# Patient Record
Sex: FEMALE | Race: BLACK OR AFRICAN AMERICAN | NOT HISPANIC OR LATINO | Employment: UNEMPLOYED | URBAN - METROPOLITAN AREA
[De-identification: names, ages, dates, MRNs, and addresses within clinical notes are randomized per-mention and may not be internally consistent; named-entity substitution may affect disease eponyms.]

---

## 2017-01-10 ENCOUNTER — OFFICE VISIT (OUTPATIENT)
Dept: INTERNAL MEDICINE | Facility: CLINIC | Age: 82
End: 2017-01-10
Payer: MEDICARE

## 2017-01-10 ENCOUNTER — LAB VISIT (OUTPATIENT)
Dept: LAB | Facility: HOSPITAL | Age: 82
End: 2017-01-10
Attending: INTERNAL MEDICINE
Payer: MEDICARE

## 2017-01-10 VITALS
SYSTOLIC BLOOD PRESSURE: 138 MMHG | TEMPERATURE: 99 F | RESPIRATION RATE: 20 BRPM | HEART RATE: 75 BPM | HEIGHT: 65 IN | OXYGEN SATURATION: 97 % | BODY MASS INDEX: 29.12 KG/M2 | DIASTOLIC BLOOD PRESSURE: 90 MMHG | WEIGHT: 174.81 LBS

## 2017-01-10 DIAGNOSIS — Z78.0 ASYMPTOMATIC MENOPAUSAL STATE: ICD-10-CM

## 2017-01-10 DIAGNOSIS — E78.5 HYPERLIPIDEMIA, UNSPECIFIED HYPERLIPIDEMIA TYPE: ICD-10-CM

## 2017-01-10 DIAGNOSIS — Z00.00 ROUTINE GENERAL MEDICAL EXAMINATION AT A HEALTH CARE FACILITY: Primary | ICD-10-CM

## 2017-01-10 DIAGNOSIS — E66.3 OVERWEIGHT (BMI 25.0-29.9): ICD-10-CM

## 2017-01-10 DIAGNOSIS — R53.83 FATIGUE, UNSPECIFIED TYPE: ICD-10-CM

## 2017-01-10 DIAGNOSIS — I50.9 CHRONIC CONGESTIVE HEART FAILURE, UNSPECIFIED CONGESTIVE HEART FAILURE TYPE: ICD-10-CM

## 2017-01-10 DIAGNOSIS — I10 ESSENTIAL HYPERTENSION: ICD-10-CM

## 2017-01-10 DIAGNOSIS — Z00.00 ROUTINE GENERAL MEDICAL EXAMINATION AT A HEALTH CARE FACILITY: ICD-10-CM

## 2017-01-10 DIAGNOSIS — N18.1 CHRONIC KIDNEY DISEASE, STAGE I: ICD-10-CM

## 2017-01-10 LAB
BASOPHILS # BLD AUTO: 0.01 K/UL
BASOPHILS NFR BLD: 0.2 %
DIFFERENTIAL METHOD: ABNORMAL
EOSINOPHIL # BLD AUTO: 0.1 K/UL
EOSINOPHIL NFR BLD: 1.3 %
ERYTHROCYTE [DISTWIDTH] IN BLOOD BY AUTOMATED COUNT: 13.7 %
HCT VFR BLD AUTO: 39.5 %
HGB BLD-MCNC: 13 G/DL
LYMPHOCYTES # BLD AUTO: 1.4 K/UL
LYMPHOCYTES NFR BLD: 23.1 %
MCH RBC QN AUTO: 31.6 PG
MCHC RBC AUTO-ENTMCNC: 32.9 %
MCV RBC AUTO: 96 FL
MONOCYTES # BLD AUTO: 0.7 K/UL
MONOCYTES NFR BLD: 11.6 %
NEUTROPHILS # BLD AUTO: 3.8 K/UL
NEUTROPHILS NFR BLD: 63.3 %
PLATELET # BLD AUTO: 185 K/UL
PMV BLD AUTO: 11.1 FL
RBC # BLD AUTO: 4.12 M/UL
WBC # BLD AUTO: 6.03 K/UL

## 2017-01-10 PROCEDURE — 84439 ASSAY OF FREE THYROXINE: CPT

## 2017-01-10 PROCEDURE — 83880 ASSAY OF NATRIURETIC PEPTIDE: CPT

## 2017-01-10 PROCEDURE — 80061 LIPID PANEL: CPT

## 2017-01-10 PROCEDURE — 80053 COMPREHEN METABOLIC PANEL: CPT

## 2017-01-10 PROCEDURE — 36415 COLL VENOUS BLD VENIPUNCTURE: CPT | Mod: PO

## 2017-01-10 PROCEDURE — 84443 ASSAY THYROID STIM HORMONE: CPT

## 2017-01-10 PROCEDURE — 85025 COMPLETE CBC W/AUTO DIFF WBC: CPT

## 2017-01-10 PROCEDURE — 99204 OFFICE O/P NEW MOD 45 MIN: CPT | Mod: S$PBB,,, | Performed by: INTERNAL MEDICINE

## 2017-01-10 PROCEDURE — 99999 PR PBB SHADOW E&M-EST. PATIENT-LVL III: CPT | Mod: PBBFAC,,, | Performed by: INTERNAL MEDICINE

## 2017-01-10 RX ORDER — HYDRALAZINE HYDROCHLORIDE AND ISOSORBIDE DINITRATE 37.5; 2 MG/1; MG/1
TABLET, FILM COATED ORAL
Refills: 11 | COMMUNITY
Start: 2016-12-19 | End: 2017-08-10 | Stop reason: SDUPTHER

## 2017-01-10 RX ORDER — TROSPIUM CHLORIDE 20 MG/1
20 TABLET, FILM COATED ORAL 2 TIMES DAILY
Refills: 3 | COMMUNITY
Start: 2016-12-15

## 2017-01-10 RX ORDER — POTASSIUM CHLORIDE 20 MEQ/1
TABLET, EXTENDED RELEASE ORAL
Refills: 3 | COMMUNITY
Start: 2016-12-30 | End: 2017-08-24 | Stop reason: ALTCHOICE

## 2017-01-10 RX ORDER — BUMETANIDE 1 MG/1
TABLET ORAL
Refills: 0 | COMMUNITY
Start: 2016-11-21

## 2017-01-10 RX ORDER — ATORVASTATIN CALCIUM 10 MG/1
TABLET, FILM COATED ORAL
Refills: 2 | COMMUNITY
Start: 2016-12-03 | End: 2017-01-24 | Stop reason: DRUGHIGH

## 2017-01-10 RX ORDER — CLOPIDOGREL BISULFATE 75 MG/1
TABLET ORAL
Refills: 1 | COMMUNITY
Start: 2016-10-29

## 2017-01-10 NOTE — MR AVS SNAPSHOT
[facility] Medicine  [address] [place] [address] [address]  [place] [address]  Phone: [phone]                  Amy Ding   1/10/2017 8:20 AM   Office Visit    Description:  Female : 1933   Provider:  Chicho Black MD   Department:  [facility] Internal Medicine           Reason for Visit     Establish Care           Diagnoses this Visit        Comments    Routine general medical examination at a health care facility    -  Primary     Essential hypertension         Chronic congestive heart failure, unspecified congestive heart failure type         Overweight (BMI 25.0-29.9)         Fatigue, unspecified type         Asymptomatic menopausal state         Hyperlipidemia, unspecified hyperlipidemia type         Chronic kidney disease, stage I                To Do List           Future Appointments        Provider Department Dept Phone    2017 8:00 AM Chicho Black MD [facility] [phone]      Goals (5 Years of Data)     None      Follow-Up and Disposition     Return in about 2 weeks (around 2017), or if symptoms worsen or fail to improve, for elevated BP and abdominal tenderness .    Follow-up and Disposition History      [facility]     [facility] On Call Nurse [facility] -  Assistance  Registered nurses in the [facility] On Call Center provide clinical advisement, health education, appointment booking, and other advisory services.  Call for this free service at [phone].             Medications           STOP taking these medications     dicyclomine (BENTYL) 10 MG capsule Take 10 mg by mouth 3 (three) times daily.           Verify that the below list of medications is an accurate representation of the medications you are currently taking.  If none reported, the list may be blank. If incorrect, please contact your healthcare provider. Carry this list with you in case of emergency.           Current Medications     amiodarone (PACERONE) 200 MG Tab Take 200 mg  "by mouth once daily.     hydrochlorothiazide (MICROZIDE) 12.5 mg capsule Take 12.5 mg by mouth once daily.    ISOSORB DINIT/HYDRALAZINE HCL (BIDIL ORAL) Take 0.5 mg by mouth 3 (three) times daily.    atorvastatin (LIPITOR) 10 MG tablet TK 1 T PO  QHS    BIDIL 20-37.5 mg Tab     bumetanide (BUMEX) 1 MG tablet TK 1 T PO BID    clopidogrel (PLAVIX) 75 mg tablet TK 1 T PO QD    potassium chloride SA (K-DUR,KLOR-CON) 20 MEQ tablet TK 1 T PO QD    trospium (SANCTURA) 20 mg Tab tablet TK 1 T PO  BID           Clinical Reference Information           Vital Signs - Last Recorded  Most recent update: 1/10/2017  8:25 AM by Weston Leslie MA    BP Pulse Temp Resp    (!) 138/90 (BP Location: Right arm, Patient Position: Sitting, BP Method: Manual) 75 98.6 °F (37 °C) (Tympanic) 20    Ht Wt SpO2 BMI    5' 5" (1.651 m) 79.3 kg (174 lb 13.2 oz) 97% 29.09 kg/m2      Blood Pressure          Most Recent Value    BP  (!)  138/90      Allergies as of 1/10/2017     No Known Allergies      Immunizations Administered on Date of Encounter - 1/10/2017     None      Orders Placed During Today's Visit      Normal Orders This Visit    Microalbumin/creatinine urine ratio     Future Labs/Procedures Expected by Expires    Brain natriuretic peptide  1/10/2017 3/11/2018    CBC auto differential  1/10/2017 3/11/2018    Comprehensive metabolic panel  1/10/2017 3/11/2018    DXA Bone Density Spine And Hip  1/10/2017 1/10/2018    Lipid panel  1/10/2017 3/11/2018    T4, free  1/10/2017 3/11/2018    TSH  1/10/2017 3/11/2018      [facility] Sign-Up     Activating your [facility] account is as easy as 1-2-3!     1) Visit [email or web address], select Sign Up Now, enter this activation code and your date of birth, then select Next.  [email or web address]  Expires: 2/24/2017  9:30 AM      2) Create a username and password to use when you visit [facility] in the future and select a security question in case you lose your password and select Next.    3) Enter your e-mail " address and click Sign Up!    Additional Information  If you have questions, please e-mail [email or web address] or call [phone] to talk to our [facility] staff. Remember, [facility] is NOT to be used for urgent needs. For medical emergencies, dial 911.

## 2017-01-10 NOTE — PROGRESS NOTES
"Subjective:      Patient ID: Amy Ding is a 83 y.o. female.    Chief Complaint: Establish Care    HPI  Ms. Ding is a patient of Dr. Sotero Cardona, who presents to \[facility]\"" primary care.     She reports having an EGD 3 years ago, after which time she was recommended to start lansoprazole.     She reports "feeling ok most of the time." She endorses having "a little head cold", which started 7 days ago. She reports blowing her nose and occasionally seeing blood. No f/c. No facial pain. No tooth pain. She has been taking cough drops and making hot tonics with lemon, which helped.     Last BM this am.    Past Medical History   Diagnosis Date    Bilateral renal masses      s/p R partial nephrectomy; L kidney intact; Now on Trospium CL 20 mg BID    Heart failure     High cholesterol     HTN (hypertension)     TIA (transient ischemic attack) 2014     [facility], where she was started on Plavix     Past Surgical History   Procedure Laterality Date    Hernia repair      Right partial nephrectomy Right 2015     Social History     Social History    Marital status:      Spouse name: N/A    Number of children: N/A    Years of education: N/A     Occupational History    disabled      Social History Main Topics    Smoking status: Never Smoker    Smokeless tobacco: Never Used    Alcohol use No    Drug use: No    Sexual activity: Not on file     Other Topics Concern    Not on file     Social History Narrative    Breakfast: Cereal or oatmeal or grits; Coffee with milk and Splenda    Lunch: Baked chicken, veggie, sometimes rice; water or fruit juice    Dinner: Shrimp soup    Snacks: Rare    Eats out: 3x/wk     Family History   Problem Relation Age of Onset    Cancer Mother     Thyroid disease Mother     Cancer Sister     Cancer Sister        Current Outpatient Prescriptions:     amiodarone (PACERONE) 200 MG Tab, Take 200 mg by mouth once daily. , Disp: , Rfl:     hydrochlorothiazide (MICROZIDE) 12.5 mg capsule, " "Take 12.5 mg by mouth once daily., Disp: , Rfl:     ISOSORB DINIT/HYDRALAZINE HCL (BIDIL ORAL), Take 0.5 mg by mouth 3 (three) times daily., Disp: , Rfl:     atorvastatin (LIPITOR) 10 MG tablet, TK 1 T PO  QHS, Disp: , Rfl: 2    BIDIL 20-37.5 mg Tab, , Disp: , Rfl: 11    bumetanide (BUMEX) 1 MG tablet, TK 1 T PO BID, Disp: , Rfl: 0    clopidogrel (PLAVIX) 75 mg tablet, TK 1 T PO QD, Disp: , Rfl: 1    potassium chloride SA (K-DUR,KLOR-CON) 20 MEQ tablet, TK 1 T PO QD, Disp: , Rfl: 3    trospium (SANCTURA) 20 mg Tab tablet, TK 1 T PO  BID, Disp: , Rfl: 3    Review of patient's allergies indicates:  No Known Allergies    Lab Results   Component Value Date    WBC 6.16 12/30/2014    HGB 13.3 12/30/2014    HCT 39.0 12/30/2014     12/30/2014     12/30/2014    K 4.0 12/30/2014     12/30/2014    CREATININE 1.0 12/30/2014    BUN 23 12/30/2014    CO2 28 12/30/2014       Visit Vitals    BP (!) 138/90 (BP Location: Right arm, Patient Position: Sitting, BP Method: Manual)    Pulse 75    Temp 98.6 °F (37 °C) (Tympanic)    Resp 20    Ht 5' 5" (1.651 m)    Wt 79.3 kg (174 lb 13.2 oz)    SpO2 97%    BMI 29.09 kg/m2     Review of Systems   Constitutional: Negative.   Cardiovascular: Negative.   Respiratory: Negative.   Gastrointestinal: Negative.   Genitourinary: Negative.   Musculoskeletal: Negative.   Derm: Negative.  Allergic/Immunologic: Negative.   Endocrine: Negative.   Hematological: Negative.   Neurological: Negative.   Psychiatric/Behavioral: Negative.     Objective:     Physical Exam  GEN: A&O fully, NAD  PSYC: Normal affect  HEENT: OP: Clear, no LAD, no thyroid masses  CV: RRR, no M/G/R  PULM: CTA bilaterally, no wheezes, rales  GI: S/ND, normal bowel sounds, mild generalized TTP  EXT: No C/C/E    No results found for: HGBA1C    Assessment:      1. Routine general medical examination at a health care facility    2. Essential hypertension: Not at goal. Continue rx for now. Will f/u in 2 " weeks.    3. Chronic congestive heart failure, unspecified congestive heart failure type    4. Overweight (BMI 25.0-29.9): Discussed strategies for lifestyle modifications, including systematically increasing yogurt, whole fruits, unsalted nuts, non-starchy vegetables, beans, weight logging and physical activity; and avoiding potatoes, red/processed meats, sugar sweetened beverages, and fast food.    5. Fatigue, unspecified type: Will check labs.   6. Asymptomatic menopausal state: Will check DEXA since she had a fx as an adult.   7. Hyperlipidemia, unspecified hyperlipidemia type: Will check lipids.     Plan:   Routine general medical examination at a health care facility  -     Comprehensive metabolic panel; Future; Expected date: 1/10/17  -     Lipid panel; Future; Expected date: 1/10/17    Essential hypertension  -     Microalbumin/creatinine urine ratio    Chronic congestive heart failure, unspecified congestive heart failure type  -     Brain natriuretic peptide; Future; Expected date: 1/10/17    Overweight (BMI 25.0-29.9)    Fatigue, unspecified type  -     CBC auto differential; Future; Expected date: 1/10/17  -     T4, free; Future; Expected date: 1/10/17  -     TSH; Future; Expected date: 1/10/17    Asymptomatic menopausal state    Hyperlipidemia, unspecified hyperlipidemia type  -     Lipid panel; Future; Expected date: 1/10/17    RTC in 2 weeks for FU on elevated BP and abdominal tenderness or sooner as needed

## 2017-01-11 LAB
ALBUMIN SERPL BCP-MCNC: 3.3 G/DL
ALP SERPL-CCNC: 106 U/L
ALT SERPL W/O P-5'-P-CCNC: 29 U/L
ANION GAP SERPL CALC-SCNC: 10 MMOL/L
AST SERPL-CCNC: 27 U/L
BILIRUB SERPL-MCNC: 0.3 MG/DL
BNP SERPL-MCNC: 35 PG/ML
BUN SERPL-MCNC: 25 MG/DL
CALCIUM SERPL-MCNC: 8.9 MG/DL
CHLORIDE SERPL-SCNC: 107 MMOL/L
CHOLEST/HDLC SERPL: 2.6 {RATIO}
CO2 SERPL-SCNC: 24 MMOL/L
CREAT SERPL-MCNC: 1.1 MG/DL
EST. GFR  (AFRICAN AMERICAN): 53.7 ML/MIN/1.73 M^2
EST. GFR  (NON AFRICAN AMERICAN): 46.5 ML/MIN/1.73 M^2
GLUCOSE SERPL-MCNC: 104 MG/DL
HDL/CHOLESTEROL RATIO: 39 %
HDLC SERPL-MCNC: 146 MG/DL
HDLC SERPL-MCNC: 57 MG/DL
LDLC SERPL CALC-MCNC: 76.4 MG/DL
NONHDLC SERPL-MCNC: 89 MG/DL
POTASSIUM SERPL-SCNC: 4.4 MMOL/L
PROT SERPL-MCNC: 7.3 G/DL
SODIUM SERPL-SCNC: 141 MMOL/L
T4 FREE SERPL-MCNC: 1.13 NG/DL
TRIGL SERPL-MCNC: 63 MG/DL
TSH SERPL DL<=0.005 MIU/L-ACNC: 0.72 UIU/ML

## 2017-01-24 ENCOUNTER — LAB VISIT (OUTPATIENT)
Dept: LAB | Facility: HOSPITAL | Age: 82
End: 2017-01-24
Attending: INTERNAL MEDICINE
Payer: MEDICARE

## 2017-01-24 ENCOUNTER — OFFICE VISIT (OUTPATIENT)
Dept: INTERNAL MEDICINE | Facility: CLINIC | Age: 82
End: 2017-01-24
Payer: MEDICARE

## 2017-01-24 VITALS
DIASTOLIC BLOOD PRESSURE: 70 MMHG | RESPIRATION RATE: 18 BRPM | OXYGEN SATURATION: 97 % | HEIGHT: 67 IN | WEIGHT: 161.38 LBS | BODY MASS INDEX: 25.33 KG/M2 | HEART RATE: 69 BPM | TEMPERATURE: 98 F | SYSTOLIC BLOOD PRESSURE: 136 MMHG

## 2017-01-24 DIAGNOSIS — E55.9 VITAMIN D DEFICIENCY: ICD-10-CM

## 2017-01-24 DIAGNOSIS — I50.20 SYSTOLIC HEART FAILURE, UNSPECIFIED HEART FAILURE CHRONICITY: ICD-10-CM

## 2017-01-24 DIAGNOSIS — E55.9 VITAMIN D DEFICIENCY: Primary | ICD-10-CM

## 2017-01-24 LAB — 25(OH)D3+25(OH)D2 SERPL-MCNC: 28 NG/ML

## 2017-01-24 PROCEDURE — 82306 VITAMIN D 25 HYDROXY: CPT

## 2017-01-24 PROCEDURE — 99999 PR PBB SHADOW E&M-EST. PATIENT-LVL III: CPT | Mod: PBBFAC,,, | Performed by: INTERNAL MEDICINE

## 2017-01-24 PROCEDURE — 99214 OFFICE O/P EST MOD 30 MIN: CPT | Mod: S$PBB,,, | Performed by: INTERNAL MEDICINE

## 2017-01-24 PROCEDURE — 36415 COLL VENOUS BLD VENIPUNCTURE: CPT | Mod: PO

## 2017-01-24 RX ORDER — ATORVASTATIN CALCIUM 20 MG/1
20 TABLET, FILM COATED ORAL DAILY
Qty: 90 TABLET | Refills: 3 | Status: SHIPPED | OUTPATIENT
Start: 2017-01-24 | End: 2019-03-01 | Stop reason: SDUPTHER

## 2017-01-24 NOTE — PROGRESS NOTES
Subjective:      Patient ID: Amy Ding is a 83 y.o. female.    Chief Complaint: Follow-up (1 month )    HPI  Ms. Ding is a patient of mine, who presents for f/u on HTN.     She reports having intermittent fatigue.     Past Medical History   Diagnosis Date    Bilateral renal masses      s/p R partial nephrectomy; L kidney intact; Now on Trospium CL 20 mg BID    Heart failure     High cholesterol     HTN (hypertension)     TIA (transient ischemic attack) 2014     [facility], where she was started on Plav[facility]     Past Surgical History   Procedure Laterality Date    Hernia repair      Right partial nephrectomy Right 2015     Social History     Social History    Marital status:      Spouse name: N/A    Number of children: N/A    Years of education: N/A     Occupational History    disabled      Social History Main Topics    Smoking status: Never Smoker    Smokeless tobacco: Never Used    Alcohol use No    Drug use: No    Sexual activity: Not on file     Other Topics Concern    Not on file     Social History Narrative    Breakfast: Cereal or oatmeal or grits; Coffee with milk and Splenda    Lunch: Baked chicken, veggie, sometimes rice; water or fruit juice    Dinner: Shrimp soup    Snacks: Rare    Eats out: 3x/wk     Family History   Problem Relation Age of Onset    Cancer Mother     Thyroid disease Mother     Cancer Sister     Cancer Sister        Current Outpatient Prescriptions:     amiodarone (PACERONE) 200 MG Tab, Take 200 mg by mouth once daily. , Disp: , Rfl:     BIDIL 20-37.5 mg Tab, , Disp: , Rfl: 11    bumetanide (BUMEX) 1 MG tablet, TK 1 T PO BID, Disp: , Rfl: 0    clopidogrel (PLAVIX) 75 mg tablet, TK 1 T PO QD, Disp: , Rfl: 1    hydrochlorothiazide (MICROZIDE) 12.5 mg capsule, Take 12.5 mg by mouth once daily., Disp: , Rfl:     ISOSORB DINIT/HYDRALAZINE HCL (BIDIL ORAL), Take 0.5 mg by mouth 3 (three) times daily., Disp: , Rfl:     potassium chloride SA (K-DUR,KLOR-CON) 20 MEQ  "tablet, TK 1 T PO QD, Disp: , Rfl: 3    trospium (SANCTURA) 20 mg Tab tablet, TK 1 T PO  BID, Disp: , Rfl: 3    atorvastatin (LIPITOR) 20 MG tablet, Take 1 tablet (20 mg total) by mouth once daily., Disp: 90 tablet, Rfl: 3    Review of patient's allergies indicates:  No Known Allergies    Lab Results   Component Value Date    WBC 6.03 01/10/2017    HGB 13.0 01/10/2017    HCT 39.5 01/10/2017     01/10/2017    CHOL 146 01/10/2017    TRIG 63 01/10/2017    HDL 57 01/10/2017    ALT 29 01/10/2017    AST 27 01/10/2017     01/10/2017    K 4.4 01/10/2017     01/10/2017    CREATININE 1.1 01/10/2017    BUN 25 (H) 01/10/2017    CO2 24 01/10/2017    TSH 0.716 01/10/2017       Visit Vitals    /70 (BP Location: Right arm, Patient Position: Sitting, BP Method: Manual)    Pulse 69    Temp 97.6 °F (36.4 °C) (Tympanic)    Resp 18    Ht 5' 7" (1.702 m)    Wt 73.2 kg (161 lb 6 oz)    SpO2 97%    BMI 25.28 kg/m2     Review of Systems   No cp.  No sob.    Objective:     Physical Exam  GEN: A&O fully, NAD  PSYC: Normal affect  CV: RRR, no M/G/R  PULM: CTA bilaterally, no wheezes, rales  EXT: No C/C/E    No results found for: HGBA1C    Assessment:     1. Vitamin D deficiency: May be the cause of her intermittent fatigue and adult fx.    2.      TIA/CHF: Stable. BNP WNL on last visit. Risks and benefits discussed and patient chose to move forward with increasing            atorvastatin from 10 to 20 mg qd. Will increase to 40 mg po qd on next visit if 20 mg qd tolerated.    3.      HTN: Well controlled. Continue current regimen.   4.      Fatigue: May be 2/2 vitamin D deficiency. BNP and TFTs WNL on last visit. May also be 2/2 dehydration,           which is the likely cause of her high BUN. She is amenable to drinking a glass of water prior to each meal.    Plan:   Vitamin D deficiency  -     Vitamin D; Future; Expected date: 7/23/17    Systolic heart failure, unspecified heart failure chronicity  -     " atorvastatin (LIPITOR) 20 MG tablet; Take 1 tablet (20 mg total) by mouth once daily.  Dispense: 90 tablet; Refill: 3      RTC in 3 months for FU on CVD or sooner as needed.

## 2017-01-24 NOTE — MR AVS SNAPSHOT
[facility]  [address]  [facility] [address]  Phone: [phone]                  Amy Ding   2017 8:00 AM   Office Visit    Description:  Female : 1933   Provider:  Chicho Blakc MD   Department:  [facility] Medicine           Reason for Visit     Follow-up           Diagnoses this Visit        Comments    Vitamin D deficiency    -  Primary     Systolic heart failure, unspecified heart failure chronicity                To Do List           Future Appointments        Provider Department Dept Phone    2017 10:20 AM LABORATORY, [facility]     2017 8:20 AM Chicho Black MD [facility] Medicine [phone]      Goals (5 Years of Data)     None      Follow-Up and Disposition     Return in about 3 months (around 2017), or if symptoms worsen or fail to improve, for FU on CHF.    Follow-up and Disposition History       These Medications        Disp Refills Start End    atorvastatin (LIPITOR) 20 MG tablet 90 tablet 3 2017    Take 1 tablet (20 mg total) by mouth once daily. - Oral    Pharmacy: [facility] [address] [place] [address] [address] [facility] Maura Huang Ph #: [phone]         [facility] On Call     [facility] -  Assistance  Registered nurses in the [facility] On Call Center provide clinical advisement, health education, appointment booking, and other advisory services.  Call for this free service at [phone].             Medications           START taking these NEW medications        Refills    atorvastatin (LIPITOR) 20 MG tablet 3    Sig: Take 1 tablet (20 mg total) by mouth once daily.    Class: Normal    Route: Oral           Verify that the below list of medications is an accurate representation of the medications you are currently taking.  If none reported, the list may be blank. If incorrect, please contact your  "healthcare provider. Carry this list with you in case of emergency.           Current Medications     amiodarone (PACERONE) 200 MG Tab Take 200 mg by mouth once daily.     BIDIL 20-37.5 mg Tab     bumetanide (BUMEX) 1 MG tablet TK 1 T PO BID    clopidogrel (PLAVIX) 75 mg tablet TK 1 T PO QD    hydrochlorothiazide (MICROZIDE) 12.5 mg capsule Take 12.5 mg by mouth once daily.    ISOSORB DINIT/HYDRALAZINE HCL (BIDIL ORAL) Take 0.5 mg by mouth 3 (three) times daily.    potassium chloride SA (K-DUR,KLOR-CON) 20 MEQ tablet TK 1 T PO QD    trospium (SANCTURA) 20 mg Tab tablet TK 1 T PO  BID    atorvastatin (LIPITOR) 20 MG tablet Take 1 tablet (20 mg total) by mouth once daily.           Clinical Reference Information           Vital Signs - Last Recorded  Most recent update: 1/24/2017  8:29 AM by Weston Leslie MA    BP Pulse Temp Resp Ht Wt    136/70 (BP Location: Right arm, Patient Position: Sitting, BP Method: Manual) 69 97.6 °F (36.4 °C) (Tympanic) 18 5' 7" (1.702 m) 73.2 kg (161 lb 6 oz)    SpO2 BMI             97% 25.28 kg/m2         Blood Pressure          Most Recent Value    BP  136/70      Allergies as of 1/24/2017     No Known Allergies      Immunizations Administered on Date of Encounter - 1/24/2017     None      Orders Placed During Today's Visit     Future Labs/Procedures Expected by Expires    Vitamin D  7/23/2017 (Approximate) 3/25/2018      [facility] Sign-Up     Activating your [facility] account is as easy as 1-2-3!     1) Visit [email or web address], select Sign Up Now, enter this activation code and your date of birth, then select Next.  [email or web address]  Expires: 2/24/2017  9:30 AM      2) Create a username and password to use when you visit [facility] in the future and select a security question in case you lose your password and select Next.    3) Enter your e-mail address and click Sign Up!    Additional Information  If you have questions, please e-mail [email or web address] or call [phone] to talk to " our [facility] staff. Remember, [facility] is NOT to be used for urgent needs. For medical emergencies, dial 911.

## 2017-04-24 ENCOUNTER — OFFICE VISIT (OUTPATIENT)
Dept: INTERNAL MEDICINE | Facility: CLINIC | Age: 82
End: 2017-04-24
Payer: MEDICARE

## 2017-04-24 VITALS
OXYGEN SATURATION: 98 % | WEIGHT: 170 LBS | RESPIRATION RATE: 18 BRPM | HEART RATE: 84 BPM | BODY MASS INDEX: 26.68 KG/M2 | SYSTOLIC BLOOD PRESSURE: 122 MMHG | HEIGHT: 67 IN | TEMPERATURE: 99 F | DIASTOLIC BLOOD PRESSURE: 60 MMHG

## 2017-04-24 DIAGNOSIS — E55.9 VITAMIN D DEFICIENCY: ICD-10-CM

## 2017-04-24 DIAGNOSIS — I50.9 CHRONIC CONGESTIVE HEART FAILURE, UNSPECIFIED CONGESTIVE HEART FAILURE TYPE: ICD-10-CM

## 2017-04-24 DIAGNOSIS — J30.1 SEASONAL ALLERGIC RHINITIS DUE TO POLLEN: ICD-10-CM

## 2017-04-24 DIAGNOSIS — R10.9 RIGHT FLANK PAIN: Primary | ICD-10-CM

## 2017-04-24 DIAGNOSIS — E86.0 DEHYDRATION: ICD-10-CM

## 2017-04-24 PROCEDURE — 99999 PR PBB SHADOW E&M-EST. PATIENT-LVL III: CPT | Mod: PBBFAC,,, | Performed by: INTERNAL MEDICINE

## 2017-04-24 PROCEDURE — 99213 OFFICE O/P EST LOW 20 MIN: CPT | Mod: PBBFAC,PN | Performed by: INTERNAL MEDICINE

## 2017-04-24 PROCEDURE — 99214 OFFICE O/P EST MOD 30 MIN: CPT | Mod: S$PBB,,, | Performed by: INTERNAL MEDICINE

## 2017-04-24 RX ORDER — LORATADINE 10 MG/1
10 TABLET ORAL DAILY
Refills: 0 | COMMUNITY
Start: 2017-04-24 | End: 2017-08-10 | Stop reason: SDUPTHER

## 2017-04-24 NOTE — PROGRESS NOTES
Subjective:      Patient ID: Amy Ding is a 83 y.o. female.    Chief Complaint: Follow-up (3 months/(R) side pain) and Labs Only    HPI  Ms. Ding is a patient of mine, who presents for FU on right side pain and labs.    She reports having intermittent right flank pain since she had her right partial nephrectomy. No hematuria. She reports her pain is 4/10 intensity, right flank region.     She reports taking vitamin D3 2,000 IU daily.    Past Medical History:   Diagnosis Date    Bilateral renal masses     s/p R partial nephrectomy; L kidney intact; Now on Trospium CL 20 mg BID    CHF (congestive heart failure)     Heart failure     High cholesterol     HTN (hypertension)     TIA (transient ischemic attack) 2014    [facility], where she was started on Plavix     Past Surgical History:   Procedure Laterality Date    HERNIA REPAIR      right partial nephrectomy Right 2015     Social History     Social History    Marital status:      Spouse name: N/A    Number of children: N/A    Years of education: N/A     Occupational History    disabled      Social History Main Topics    Smoking status: Never Smoker    Smokeless tobacco: Never Used    Alcohol use No    Drug use: No    Sexual activity: Not on file     Other Topics Concern    Not on file     Social History Narrative    Breakfast: Cereal or oatmeal or grits; Coffee with milk and Splenda    Lunch: Baked chicken, veggie, sometimes rice; water or fruit juice    Dinner: Shrimp soup    Snacks: Rare    Eats out: 3x/wk    Water: 2 bottles (16 oz) per day     Family History   Problem Relation Age of Onset    Cancer Mother     Thyroid disease Mother     Cancer Sister     Cancer Sister        Current Outpatient Prescriptions:     amiodarone (PACERONE) 200 MG Tab, Take 200 mg by mouth once daily. , Disp: , Rfl:     atorvastatin (LIPITOR) 20 MG tablet, Take 1 tablet (20 mg total) by mouth once daily., Disp: 90 tablet, Rfl: 3    BIDIL 20-37.5 mg Tab, ,  "Disp: , Rfl: 11    bumetanide (BUMEX) 1 MG tablet, TK 1 T PO BID, Disp: , Rfl: 0    clopidogrel (PLAVIX) 75 mg tablet, TK 1 T PO QD, Disp: , Rfl: 1    hydrochlorothiazide (MICROZIDE) 12.5 mg capsule, Take 12.5 mg by mouth once daily., Disp: , Rfl:     ISOSORB DINIT/HYDRALAZINE HCL (BIDIL ORAL), Take 0.5 mg by mouth 3 (three) times daily., Disp: , Rfl:     potassium chloride SA (K-DUR,KLOR-CON) 20 MEQ tablet, TK 1 T PO QD, Disp: , Rfl: 3    trospium (SANCTURA) 20 mg Tab tablet, TK 1 T PO  BID, Disp: , Rfl: 3    loratadine (CLARITIN) 10 mg tablet, Take 1 tablet (10 mg total) by mouth once daily., Disp: , Rfl: 0    Review of patient's allergies indicates:   Allergen Reactions    Sulfa (sulfonamide antibiotics) Anaphylaxis     Lab Results   Component Value Date    WBC 6.03 01/10/2017    HGB 13.0 01/10/2017    HCT 39.5 01/10/2017     01/10/2017    CHOL 146 01/10/2017    TRIG 63 01/10/2017    HDL 57 01/10/2017    ALT 29 01/10/2017    AST 27 01/10/2017     01/10/2017    K 4.4 01/10/2017     01/10/2017    CREATININE 1.1 01/10/2017    BUN 25 (H) 01/10/2017    CO2 24 01/10/2017    TSH 0.716 01/10/2017     /60  Pulse 84  Temp 98.6 °F (37 °C) (Tympanic)   Resp 18  Ht 5' 7" (1.702 m)  Wt 77.1 kg (169 lb 15.6 oz)  SpO2 98%  BMI 26.62 kg/m2    Review of Systems   No sob  No cp  + increased thirst    Objective:     Physical Exam  GEN: A&O fully, NAD  PSYC: Normal affect  HEENT: OP: Clear, no LAD, no thyroid masses  CV: RRR, no M/G/R  PULM: CTA bilaterally, no wheezes, rales  GI/: Abdomen s/nd, +TTP right flank 3 cm superior to incision site of partial nephrectomy    EXT: No C/C/E      Assessment:     1. Right flank pain: Mild, intermittent. Likely 2/2 right partial nephrectomy. DDx includes cholestasis. Will check RUQ.    2. Chronic congestive heart failure, unspecified congestive heart failure type. Will check ECHO.   3. Vitamin D deficiency: She is taking vitamin D3 2,000 IU po qd.    4. " Dehydration: Likely 2/2 Bumex, which she takes for her CHF. Will check ECHO as above and she was encouraged to drink more water since she is only taking in 32 oz per day.    5.      Nasal congestion: Likely 2/2 pollen. Will prescribe Claritin prn in addition to hydration.     Plan:   Right flank pain  -     US Abdomen Limited; Future; Expected date: 4/24/17    Chronic congestive heart failure, unspecified congestive heart failure type  -     2D Echo Only; Future    Vitamin D deficiency    Dehydration    Seasonal allergic rhinitis due to pollen  -     loratadine (CLARITIN) 10 mg tablet; Take 1 tablet (10 mg total) by mouth once daily.; Refill: 0      RTC in 3 months for FU on CHF, dehydration and nasal congstion

## 2017-04-24 NOTE — MR AVS SNAPSHOT
[facility]  [address] [place] [address] [address]  [place] [address]  Phone: [phone]                  Amy Ding   2017 8:20 AM   Office Visit    Description:  Female : 1933   Provider:  Chicho Black MD   Department:  [facility]           Reason for Visit     Follow-up     Labs Only           Diagnoses this Visit        Comments    Right flank pain    -  Primary     Chronic congestive heart failure, unspecified congestive heart failure type         Vitamin D deficiency         Dehydration         Seasonal allergic rhinitis due to pollen                To Do List           Future Appointments        Provider Department Dept Phone    2017 11:00 AM SUMH US3 [facility] [phone]    2017 2:30 PM ECHOCARDIOGRAM, [facility] -Cardiology [phone]    2017 9:00 AM Chicho Black MD [facility] [phone]      Goals (5 Years of Data)     None      Follow-Up and Disposition     Return in about 3 months (around 2017), or if symptoms worsen or fail to improve, for FU dehydration, R flank pain & vitD insuf.    Follow-up and Disposition History      PURCHASE these Medications (No prescription required)        Start End    loratadine (CLARITIN) 10 mg tablet 2017    Sig - Route: Take 1 tablet (10 mg total) by mouth once daily. - Oral    Class: OTC      [facility] On Call     [facility] On Call Nurse Care Line -  Assistance  Unless otherwise directed by your provider, please contact [facility], our nurse care line that is available for  assistance.     Registered nurses in the [facility] On Call Center provide: appointment scheduling, clinical advisement, health education, and other advisory services.  Call: [phone] (toll free)               Medications           START taking these NEW medications        Refills    loratadine (CLARITIN) 10 mg tablet 0    Sig: Take 1 tablet (10 mg total) by  "mouth once daily.    Class: OTC    Route: Oral           Verify that the below list of medications is an accurate representation of the medications you are currently taking.  If none reported, the list may be blank. If incorrect, please contact your healthcare provider. Carry this list with you in case of emergency.           Current Medications     amiodarone (PACERONE) 200 MG Tab Take 200 mg by mouth once daily.     atorvastatin (LIPITOR) 20 MG tablet Take 1 tablet (20 mg total) by mouth once daily.    BIDIL 20-37.5 mg Tab     bumetanide (BUMEX) 1 MG tablet TK 1 T PO BID    clopidogrel (PLAVIX) 75 mg tablet TK 1 T PO QD    hydrochlorothiazide (MICROZIDE) 12.5 mg capsule Take 12.5 mg by mouth once daily.    ISOSORB DINIT/HYDRALAZINE HCL (BIDIL ORAL) Take 0.5 mg by mouth 3 (three) times daily.    potassium chloride SA (K-DUR,KLOR-CON) 20 MEQ tablet TK 1 T PO QD    trospium (SANCTURA) 20 mg Tab tablet TK 1 T PO  BID    loratadine (CLARITIN) 10 mg tablet Take 1 tablet (10 mg total) by mouth once daily.           Clinical Reference Information           Your Vitals Were     BP Pulse Temp Resp Height Weight    122/60 84 98.6 °F (37 °C) (Tympanic) 18 5' 7" (1.702 m) 77.1 kg (169 lb 15.6 oz)    SpO2 BMI             98% 26.62 kg/m2         Blood Pressure          Most Recent Value    BP  122/60      Allergies as of 4/24/2017     Sulfa (Sulfonamide Antibiotics)      Immunizations Administered on Date of Encounter - 4/24/2017     None      Orders Placed During Today's Visit     Future Labs/Procedures Expected by Expires    US Abdomen Limited  4/24/2017 4/24/2018    2D Echo Only  As directed 4/24/2018      [facility] Sign-Up     Activating your [facility] account is as easy as 1-2-3!     1) Visit [email or web address], select Sign Up Now, enter this activation code and your date of birth, then select Next.  [email or web address]  Expires: 6/8/2017  9:38 AM      2) Create a username and password to use when you visit [facility] in the " future and select a security question in case you lose your password and select Next.    3) Enter your e-mail address and click Sign Up!    Additional Information  If you have questions, please e-mail [email or web address] or call [phone] to talk to our [facility] staff. Remember, [facility] is NOT to be used for urgent needs. For medical emergencies, dial 911.         Language Assistance Services     ATTENTION: Language assistance services are available, free of charge. Please call [phone].      ATENCIÓN: Si habla español, tiene a lopez disposición servicios gratuitos de asistencia lingüística. Llame al [phone].     CHÚ Ý: N?u b?n nói Ti?ng Vi?t, có các d?ch v? h? tr? ngôn ng? mi?n phí dành cho b?n. G?i s? [phone].         [facility] complies with applicable Federal civil rights laws and does not discriminate on the basis of race, color, national origin, age, disability, or sex.

## 2017-04-27 ENCOUNTER — TELEPHONE (OUTPATIENT)
Dept: RADIOLOGY | Facility: HOSPITAL | Age: 82
End: 2017-04-27

## 2017-04-28 ENCOUNTER — CLINICAL SUPPORT (OUTPATIENT)
Dept: CARDIOLOGY | Facility: CLINIC | Age: 82
End: 2017-04-28
Payer: MEDICARE

## 2017-04-28 ENCOUNTER — HOSPITAL ENCOUNTER (OUTPATIENT)
Dept: RADIOLOGY | Facility: HOSPITAL | Age: 82
Discharge: HOME | End: 2017-04-28
Attending: INTERNAL MEDICINE
Payer: MEDICARE

## 2017-04-28 DIAGNOSIS — R10.9 RIGHT FLANK PAIN: ICD-10-CM

## 2017-04-28 DIAGNOSIS — I50.9 CHRONIC CONGESTIVE HEART FAILURE, UNSPECIFIED CONGESTIVE HEART FAILURE TYPE: ICD-10-CM

## 2017-04-28 PROCEDURE — 93307 TTE W/O DOPPLER COMPLETE: CPT | Mod: PBBFAC,PO | Performed by: INTERNAL MEDICINE

## 2017-04-28 PROCEDURE — 76705 ECHO EXAM OF ABDOMEN: CPT | Mod: 26,,, | Performed by: RADIOLOGY

## 2017-04-29 LAB
DIASTOLIC DYSFUNCTION: YES
ESTIMATED PA SYSTOLIC PRESSURE: 23.62
RETIRED EF AND QEF - SEE NOTES: 60 (ref 55–65)

## 2017-08-10 ENCOUNTER — OFFICE VISIT (OUTPATIENT)
Dept: INTERNAL MEDICINE | Facility: CLINIC | Age: 82
End: 2017-08-10
Payer: MEDICARE

## 2017-08-10 VITALS
WEIGHT: 171.06 LBS | DIASTOLIC BLOOD PRESSURE: 63 MMHG | TEMPERATURE: 96 F | HEIGHT: 67 IN | SYSTOLIC BLOOD PRESSURE: 133 MMHG | BODY MASS INDEX: 26.85 KG/M2 | HEART RATE: 63 BPM

## 2017-08-10 DIAGNOSIS — I50.30 (HFPEF) HEART FAILURE WITH PRESERVED EJECTION FRACTION: Primary | ICD-10-CM

## 2017-08-10 DIAGNOSIS — J30.1 SEASONAL ALLERGIC RHINITIS DUE TO POLLEN, UNSPECIFIED CHRONICITY: ICD-10-CM

## 2017-08-10 PROCEDURE — 99213 OFFICE O/P EST LOW 20 MIN: CPT | Mod: PBBFAC,PN | Performed by: INTERNAL MEDICINE

## 2017-08-10 PROCEDURE — 99999 PR PBB SHADOW E&M-EST. PATIENT-LVL III: CPT | Mod: PBBFAC,,, | Performed by: INTERNAL MEDICINE

## 2017-08-10 PROCEDURE — 3078F DIAST BP <80 MM HG: CPT | Mod: ,,, | Performed by: INTERNAL MEDICINE

## 2017-08-10 PROCEDURE — 3008F BODY MASS INDEX DOCD: CPT | Mod: ,,, | Performed by: INTERNAL MEDICINE

## 2017-08-10 PROCEDURE — 3075F SYST BP GE 130 - 139MM HG: CPT | Mod: ,,, | Performed by: INTERNAL MEDICINE

## 2017-08-10 PROCEDURE — 1159F MED LIST DOCD IN RCRD: CPT | Mod: ,,, | Performed by: INTERNAL MEDICINE

## 2017-08-10 PROCEDURE — 99213 OFFICE O/P EST LOW 20 MIN: CPT | Mod: S$PBB,,, | Performed by: INTERNAL MEDICINE

## 2017-08-10 RX ORDER — LORATADINE 10 MG/1
10 TABLET ORAL DAILY
Refills: 0 | COMMUNITY
Start: 2017-08-10 | End: 2019-03-01

## 2017-08-10 RX ORDER — ATORVASTATIN CALCIUM 10 MG/1
TABLET, FILM COATED ORAL
Refills: 2 | COMMUNITY
Start: 2017-07-20 | End: 2017-08-10

## 2017-08-10 NOTE — PROGRESS NOTES
Subjective:      Patient ID: Amy Ding is a 83 y.o. female.    Chief Complaint: Follow-up      9      Ms. Ding is a patient of mine, who presents for f/u on CHF, HTN, HL, TIA. No complaints. No cp. No sob.     Past Medical History:   Diagnosis Date    Bilateral renal masses     s/p R partial nephrectomy; L kidney intact; Now on Trospium CL 20 mg BID    CHF (congestive heart failure)     Heart failure     Grade 1 diastolic dysfunction; EF 60% per ECHO 4/28/17    High cholesterol     HTN (hypertension)     TIA (transient ischemic attack) 2014    [facility], where she was started on Plavix     Past Surgical History:   Procedure Laterality Date    HERNIA REPAIR      right partial nephrectomy Right 2015     Social History     Social History    Marital status:      Spouse name: N/A    Number of children: N/A    Years of education: N/A     Occupational History    disabled      Social History Main Topics    Smoking status: Never Smoker    Smokeless tobacco: Never Used    Alcohol use No    Drug use: No    Sexual activity: Not on file     Other Topics Concern    Not on file     Social History Narrative    Breakfast: Cereal or oatmeal or grits; Coffee with milk and Splenda    Lunch: Baked chicken, veggie, sometimes rice; water or fruit juice    Dinner: Shrimp soup    Snacks: Rare    Eats out: 3x/wk    Water: 2 bottles (16 oz) per day     Family History   Problem Relation Age of Onset    Cancer Mother     Thyroid disease Mother     Cancer Sister     Cancer Sister        Current Outpatient Prescriptions:     amiodarone (PACERONE) 200 MG Tab, Take 200 mg by mouth once daily. , Disp: , Rfl:     atorvastatin (LIPITOR) 20 MG tablet, Take 1 tablet (20 mg total) by mouth once daily., Disp: 90 tablet, Rfl: 3    bumetanide (BUMEX) 1 MG tablet, TK 1 T PO BID, Disp: , Rfl: 0    clopidogrel (PLAVIX) 75 mg tablet, TK 1 T PO QD, Disp: , Rfl: 1    hydrochlorothiazide (MICROZIDE) 12.5 mg capsule, Take 12.5 mg  "by mouth once daily., Disp: , Rfl:     loratadine (CLARITIN) 10 mg tablet, Take 1 tablet (10 mg total) by mouth once daily., Disp: , Rfl: 0    potassium chloride SA (K-DUR,KLOR-CON) 20 MEQ tablet, TK 1 T PO QD, Disp: , Rfl: 3    trospium (SANCTURA) 20 mg Tab tablet, Take 20 mg by mouth 2 (two) times daily., Disp: , Rfl: 3    Review of patient's allergies indicates:   Allergen Reactions    Sulfa (sulfonamide antibiotics) Anaphylaxis     Review of Systems   Constitutional: Negative.   Cardiovascular: Negative.   Respiratory: Negative.   Gastrointestinal: Negative.   Genitourinary: Negative.   Musculoskeletal: Negative.   Derm: Negative.  Allergic/Immunologic: Negative.   Endocrine: Negative.   Hematological: Negative.   Neurological: Negative.   Psychiatric/Behavioral: Negative.     Objective:     /63 (BP Location: Left arm, Patient Position: Sitting, BP Method: Medium (Manual))   Pulse 63   Temp 96 °F (35.6 °C) (Tympanic)   Ht 5' 7" (1.702 m)   Wt 77.6 kg (171 lb 1.2 oz)   BMI 26.79 kg/m²     Physical Exam  GEN: A&O fully, NAD  PSYC: Normal affect  CV: RRR, no M/G/R  PULM: CTA bilaterally, no wheezes, rales  EXT: No C/C; trace ankle edema bilaterally    Lab Results   Component Value Date    WBC 6.03 01/10/2017    HGB 13.0 01/10/2017    HCT 39.5 01/10/2017     01/10/2017    CHOL 146 01/10/2017    TRIG 63 01/10/2017    HDL 57 01/10/2017    ALT 29 01/10/2017    AST 27 01/10/2017     01/10/2017    K 4.4 01/10/2017     01/10/2017    CREATININE 1.1 01/10/2017    BUN 25 (H) 01/10/2017    CO2 24 01/10/2017    TSH 0.716 01/10/2017     ECHO 4/2017:  CONCLUSIONS     1 - Mild left atrial enlargement.     2 - Concentric hypertrophy.     3 - No wall motion abnormalities.     4 - Normal left ventricular systolic function (EF 60-65%).     5 - Impaired LV relaxation, normal LAP (grade 1 diastolic dysfunction).     6 - Normal right ventricular systolic function .     7 - The estimated PA systolic " pressure is greater than 24 mmHg.     Assessment:   1. Hypotension: Low diastolic BP. Will stop Bidil given her dependency on pre-load with dCHF, which      Her cardiologist, Dylon Persaud, had started. She prefers to have her cardiologist and PCP in close      Communication and is open to referral to an [facility] cardiologist. Will refer today to establish       Cardiac care. No clear indication for cardiac exercise rehab at this time. Will f/u in 2       weeks re BP. Also counseled on drinking 1/2 her bodyweight in water per day in oz.     RTC in 2 weeks RE dCHF and BP or sooner as needed

## 2017-08-24 ENCOUNTER — OFFICE VISIT (OUTPATIENT)
Dept: CARDIOLOGY | Facility: CLINIC | Age: 82
End: 2017-08-24
Payer: MEDICARE

## 2017-08-24 VITALS
SYSTOLIC BLOOD PRESSURE: 128 MMHG | WEIGHT: 172 LBS | HEART RATE: 64 BPM | BODY MASS INDEX: 27 KG/M2 | DIASTOLIC BLOOD PRESSURE: 62 MMHG | HEIGHT: 67 IN

## 2017-08-24 DIAGNOSIS — I50.32 CHRONIC DIASTOLIC HF (HEART FAILURE): Chronic | ICD-10-CM

## 2017-08-24 DIAGNOSIS — I10 ESSENTIAL HYPERTENSION: ICD-10-CM

## 2017-08-24 DIAGNOSIS — I11.0 HYPERTENSIVE CHF (CONGESTIVE HEART FAILURE): Primary | Chronic | ICD-10-CM

## 2017-08-24 DIAGNOSIS — I10 BENIGN ESSENTIAL HTN: ICD-10-CM

## 2017-08-24 DIAGNOSIS — I50.32 CHRONIC DIASTOLIC HEART FAILURE: ICD-10-CM

## 2017-08-24 PROCEDURE — 99204 OFFICE O/P NEW MOD 45 MIN: CPT | Mod: S$PBB,,, | Performed by: NUCLEAR MEDICINE

## 2017-08-24 PROCEDURE — 93005 ELECTROCARDIOGRAM TRACING: CPT | Mod: PBBFAC,PO | Performed by: NUCLEAR MEDICINE

## 2017-08-24 PROCEDURE — 99999 PR PBB SHADOW E&M-EST. PATIENT-LVL III: CPT | Mod: PBBFAC,,, | Performed by: NUCLEAR MEDICINE

## 2017-08-24 PROCEDURE — 1159F MED LIST DOCD IN RCRD: CPT | Mod: ,,, | Performed by: NUCLEAR MEDICINE

## 2017-08-24 PROCEDURE — 3074F SYST BP LT 130 MM HG: CPT | Mod: ,,, | Performed by: NUCLEAR MEDICINE

## 2017-08-24 PROCEDURE — 3078F DIAST BP <80 MM HG: CPT | Mod: ,,, | Performed by: NUCLEAR MEDICINE

## 2017-08-24 PROCEDURE — 99213 OFFICE O/P EST LOW 20 MIN: CPT | Mod: PBBFAC,PO | Performed by: NUCLEAR MEDICINE

## 2017-08-24 PROCEDURE — 93010 ELECTROCARDIOGRAM REPORT: CPT | Mod: S$PBB,,, | Performed by: NUCLEAR MEDICINE

## 2017-08-24 PROCEDURE — 1126F AMNT PAIN NOTED NONE PRSNT: CPT | Mod: ,,, | Performed by: NUCLEAR MEDICINE

## 2017-08-24 RX ORDER — HYDRALAZINE HYDROCHLORIDE AND ISOSORBIDE DINITRATE 37.5; 2 MG/1; MG/1
20-37.5 TABLET, FILM COATED ORAL 2 TIMES DAILY
COMMUNITY
Start: 2017-08-12 | End: 2017-08-24 | Stop reason: ALTCHOICE

## 2017-08-24 RX ORDER — SPIRONOLACTONE 25 MG/1
25 TABLET ORAL DAILY
Qty: 30 TABLET | Refills: 3 | Status: SHIPPED | OUTPATIENT
Start: 2017-08-24 | End: 2017-12-12 | Stop reason: ALTCHOICE

## 2017-08-24 NOTE — PROGRESS NOTES
Subjective:   Patient ID:  Amy Ding is a 83 y.o. female who presents for evaluation of Consult; Congestive Heart Failure (HFPEF-DD); Hypertension; and Atrial Fibrillation (PAROXYSMAL)      HPI REFERRED BY PCP , FOR CARD EVALUATION OF ABNORMAL ECHO  4/28/17-  CONCENTRIC LVH, MILD LAE,  DD.  NORMAL LV SYSTOLIC FUNCTION- LV EF 60%-  NO STRUCTURAL VALVE ABNS  CHRONIC HX OF ESSENTIAL HTN.  ON BIDIL AND DIURETICS, ALSO HX OF PAF, ON AMIODARONE AND PLAVIX  PRESENTLY-MILD YANG AND FATIGUE WITH MODERATE EXERTION. NO YANG WITH ORDINARY DAILY ACTIVITIES. NO DYSPNEA AT REST. NO ORTHOPNEA OR PND  NO HX OF ANGINA  OR EQUIVALENT  NO HX OF TIA OR STROKE  NO HX OF INTERMITTENT CLAUDICATION. NO EDEMA. OR CALVE TENDERNESS  NO HX OF NEAR SYNCOPE OR SYNCOPE  NO HX OF DM, OR CKD    Review of Systems   Constitution: Negative for chills, decreased appetite, fever, weakness, malaise/fatigue, weight gain and weight loss.   HENT: Negative for headaches and nosebleeds.    Eyes: Negative for blurred vision, double vision and visual disturbance.   Cardiovascular: Positive for palpitations. Negative for chest pain, claudication, cyanosis, dyspnea on exertion, irregular heartbeat, leg swelling, near-syncope, orthopnea, paroxysmal nocturnal dyspnea and syncope.   Respiratory: Negative for cough, hemoptysis, shortness of breath, sleep disturbances due to breathing, snoring and wheezing.    Endocrine: Negative for cold intolerance, heat intolerance, polydipsia and polyuria.   Hematologic/Lymphatic: Does not bruise/bleed easily.   Skin: Negative for flushing and rash.   Musculoskeletal: Negative for gout, joint pain, joint swelling, muscle weakness and myalgias.   Gastrointestinal: Negative for abdominal pain, anorexia, change in bowel habit, constipation, diarrhea, dysphagia, heartburn, hematemesis, hematochezia, jaundice, melena, nausea and vomiting.   Genitourinary: Negative for decreased libido, frequency, hematuria, nocturia and  urgency.   Neurological: Negative for difficulty with concentration, excessive daytime sleepiness, dizziness, focal weakness, light-headedness, numbness, seizures, tremors and vertigo.   Psychiatric/Behavioral: Negative for depression and memory loss. The patient is not nervous/anxious.    Allergic/Immunologic: Negative for environmental allergies and hives.         Objective:     Physical Exam   Constitutional: She is oriented to person, place, and time. She appears well-developed. No distress.   HENT:   Head: Normocephalic.   Eyes: Conjunctivae are normal. Pupils are equal, round, and reactive to light. No scleral icterus.   Neck: Normal range of motion. Neck supple. Normal carotid pulses, no hepatojugular reflux and no JVD present. Carotid bruit is not present. No edema present. No thyroid mass and no thyromegaly present.   Cardiovascular: Normal rate, regular rhythm, S1 normal, S2 normal and intact distal pulses.  PMI is not displaced.  Exam reveals no gallop and no friction rub.    Murmur heard.   Medium-pitched mid to late systolic murmur is present with a grade of 3/6  at the upper right sternal border, upper left sternal border, lower left sternal border The intensity increases with respiration.  Pulses:       Carotid pulses are 2+ on the right side, and 2+ on the left side.       Radial pulses are 2+ on the right side, and 2+ on the left side.        Femoral pulses are 2+ on the right side, and 2+ on the left side.       Popliteal pulses are 2+ on the right side, and 2+ on the left side.        Dorsalis pedis pulses are 2+ on the right side, and 2+ on the left side.        Posterior tibial pulses are 2+ on the right side, and 2+ on the left side.   Pulmonary/Chest: Effort normal and breath sounds normal. She has no wheezes. She has no rales. She exhibits no tenderness.   Abdominal: Soft. Bowel sounds are normal. She exhibits no pulsatile midline mass and no mass. There is no hepatosplenomegaly. There is no  tenderness.   Musculoskeletal: Normal range of motion. She exhibits no edema or tenderness.        Cervical back: Normal.        Thoracic back: Normal.        Lumbar back: Normal.   Lymphadenopathy:     She has no cervical adenopathy.     She has no axillary adenopathy.        Right: No supraclavicular adenopathy present.        Left: No supraclavicular adenopathy present.   Neurological: She is alert and oriented to person, place, and time. She has normal strength and normal reflexes. No sensory deficit. Gait normal.   Skin: Skin is warm. No rash noted. No cyanosis. No pallor. Nails show no clubbing.   Psychiatric: She has a normal mood and affect. Her speech is normal and behavior is normal. Cognition and memory are normal.       Assessment:     1. Hypertensive CHF (congestive heart failure)    2. Chronic diastolic HF (heart failure)    3. Essential hypertension      NO CLINICAL EVIDENCE OF ADHF.   NO ACTIVE MYOCARDIAL ISCHEMIA  NO EVIDENCE OF RECURRENT CARD ARRHYTHMIAS  BP WELL CONTROLLED  CNS STATUS STABLE  Plan:     1-  DC BIDIL AND KCL  2-DECREASE AMIODARONE  MG DAILY  3-START ALDACTONE 25 MG DAILY  4- CHECK BMP IN ONE WEEK  5- RETURN IN 4 WEEKS WITH BMP

## 2017-08-24 NOTE — LETTER
August 24, 2017      Chicho Black MD  [address]  [address]  [place] [address]           [facility]  [address]  [place] [address]  Phone: [phone]  Fax: [phone]          Patient: Amy Ding   MR Number: [id number]   YOB: 1933   Date of Visit: 8/24/2017       Dear Dr. Chicho Black:    Thank you for referring Amy Ding to me for evaluation. Attached you will find relevant portions of my assessment and plan of care.    If you have questions, please do not hesitate to call me. I look forward to following Amy Ding along with you.    Sincerely,    Felipe Alva MD    Enclosure  CC:  No Recipients    If you would like to receive this communication electronically, please contact [email or web address] or [phone] to request more information on [employer] access.    For providers and/or their staff who would like to refer a patient to [facility], please contact us through our one-stop-shop provider referral line, [facility] [place], at [phone].    If you feel you have received this communication in error or would no longer like to receive these types of communications, please e-mail [email or web address]

## 2017-08-25 ENCOUNTER — TELEPHONE (OUTPATIENT)
Dept: INTERNAL MEDICINE | Facility: CLINIC | Age: 82
End: 2017-08-25

## 2017-08-25 NOTE — TELEPHONE ENCOUNTER
Spoke with Renee, she stated she is trying to get in touch with 's office due to stopping medications pt was prescribed. Let Renee know I would send the message to his office to have his staff contact her. Renee verbalized understanding.

## 2017-08-25 NOTE — TELEPHONE ENCOUNTER
----- Message from Isaias Watson sent at 8/25/2017  2:09 PM CDT -----  Contact: kiley from Dr Persaud office--[phone]  Would like to consult with nurse about medication pt was authorized to stop. Has questions. Please call back at [phone]. Thx. lj

## 2017-08-28 ENCOUNTER — TELEPHONE (OUTPATIENT)
Dept: CARDIOLOGY | Facility: CLINIC | Age: 82
End: 2017-08-28

## 2017-08-28 DIAGNOSIS — I10 BENIGN ESSENTIAL HTN: Primary | ICD-10-CM

## 2017-08-28 DIAGNOSIS — I50.32 CHRONIC DIASTOLIC HEART FAILURE: ICD-10-CM

## 2017-08-28 NOTE — TELEPHONE ENCOUNTER
----- Message from Coral Mora sent at 8/28/2017  8:50 AM CDT -----  Contact: Dr. Arteaga / Dorene  States the patient said Dr. Alva told her to stop her potassium and Bidil. Also said for her to see another cardiologist.   Calling to get clinic notes to reflect that. Please call Dorene @ [phone] and fax [phone]. thanks, pablo

## 2017-09-06 ENCOUNTER — LAB VISIT (OUTPATIENT)
Dept: LAB | Facility: HOSPITAL | Age: 82
End: 2017-09-06
Attending: NUCLEAR MEDICINE
Payer: MEDICARE

## 2017-09-06 ENCOUNTER — OFFICE VISIT (OUTPATIENT)
Dept: INTERNAL MEDICINE | Facility: CLINIC | Age: 82
End: 2017-09-06
Payer: MEDICARE

## 2017-09-06 VITALS
HEIGHT: 67 IN | HEART RATE: 66 BPM | RESPIRATION RATE: 18 BRPM | SYSTOLIC BLOOD PRESSURE: 138 MMHG | TEMPERATURE: 98 F | WEIGHT: 169.31 LBS | OXYGEN SATURATION: 97 % | DIASTOLIC BLOOD PRESSURE: 70 MMHG | BODY MASS INDEX: 26.57 KG/M2

## 2017-09-06 DIAGNOSIS — I11.0 HYPERTENSIVE CHF (CONGESTIVE HEART FAILURE): Chronic | ICD-10-CM

## 2017-09-06 DIAGNOSIS — I50.9 CHRONIC CONGESTIVE HEART FAILURE, UNSPECIFIED CONGESTIVE HEART FAILURE TYPE: Primary | ICD-10-CM

## 2017-09-06 LAB
ANION GAP SERPL CALC-SCNC: 10 MMOL/L
BUN SERPL-MCNC: 33 MG/DL
CALCIUM SERPL-MCNC: 9.2 MG/DL
CHLORIDE SERPL-SCNC: 102 MMOL/L
CO2 SERPL-SCNC: 27 MMOL/L
CREAT SERPL-MCNC: 1.4 MG/DL
EST. GFR  (AFRICAN AMERICAN): 40.1 ML/MIN/1.73 M^2
EST. GFR  (NON AFRICAN AMERICAN): 34.8 ML/MIN/1.73 M^2
GLUCOSE SERPL-MCNC: 106 MG/DL
POTASSIUM SERPL-SCNC: 4.5 MMOL/L
SODIUM SERPL-SCNC: 139 MMOL/L

## 2017-09-06 PROCEDURE — 99213 OFFICE O/P EST LOW 20 MIN: CPT | Mod: PBBFAC,PN | Performed by: INTERNAL MEDICINE

## 2017-09-06 PROCEDURE — 36415 COLL VENOUS BLD VENIPUNCTURE: CPT | Mod: PO

## 2017-09-06 PROCEDURE — 99213 OFFICE O/P EST LOW 20 MIN: CPT | Mod: S$PBB,,, | Performed by: INTERNAL MEDICINE

## 2017-09-06 PROCEDURE — 3078F DIAST BP <80 MM HG: CPT | Mod: ,,, | Performed by: INTERNAL MEDICINE

## 2017-09-06 PROCEDURE — 80048 BASIC METABOLIC PNL TOTAL CA: CPT

## 2017-09-06 PROCEDURE — 3075F SYST BP GE 130 - 139MM HG: CPT | Mod: ,,, | Performed by: INTERNAL MEDICINE

## 2017-09-06 PROCEDURE — 1126F AMNT PAIN NOTED NONE PRSNT: CPT | Mod: ,,, | Performed by: INTERNAL MEDICINE

## 2017-09-06 PROCEDURE — 99999 PR PBB SHADOW E&M-EST. PATIENT-LVL III: CPT | Mod: PBBFAC,,, | Performed by: INTERNAL MEDICINE

## 2017-09-06 PROCEDURE — 1159F MED LIST DOCD IN RCRD: CPT | Mod: ,,, | Performed by: INTERNAL MEDICINE

## 2017-09-06 RX ORDER — POTASSIUM CHLORIDE 20 MEQ/1
TABLET, EXTENDED RELEASE ORAL
Qty: 90 TABLET | Refills: 3 | Status: SHIPPED | OUTPATIENT
Start: 2017-09-06 | End: 2018-12-17 | Stop reason: SDUPTHER

## 2017-09-06 NOTE — PROGRESS NOTES
"Subjective:      Patient ID: Amy Ding is a 83 y.o. female.    Chief Complaint: Follow-up (2  weeks **) and Heart Failure      HPI  Ms. Ding is a patient of mine, who presents for f/u on HTN and CHF. She reports feeling "ok". She saw Dr. Alva in cardiology due to wanting to consider having all of her MDs in the same system. Dr. Lezama also noted her BP was on the low side and recommended stopping Bidil as we did in clinic a few days before. Dr. Lezama also recommended stopping potassium and starting Aldactone. She reports stopping potassium but didn't start Aldactone and restarted her Bidil when she noticed her sBP in the 160s and after talking to Dr. Dylon Arteaga. She now prefers to continue her cardiology care with Dr. Arteaga despite his being in a separate system due to her familiarity with him and understands this may lead to delays in communication.    Past Medical History:   Diagnosis Date    Atrial fibrillation     PAROXYSMAL    Bilateral renal masses     s/p R partial nephrectomy; L kidney intact; Now on Trospium CL 20 mg BID    CHF (congestive heart failure)     Heart failure     Grade 1 diastolic dysfunction; EF 60% per ECHO 4/28/17    High cholesterol     HTN (hypertension)     Stroke     TIA - 2015    TIA (transient ischemic attack) 2014    [facility], where she was started on Plavix     Past Surgical History:   Procedure Laterality Date    HERNIA REPAIR      right partial nephrectomy Right 2015     Social History     Social History    Marital status:      Spouse name: N/A    Number of children: N/A    Years of education: N/A     Occupational History    disabled      Social History Main Topics    Smoking status: Never Smoker    Smokeless tobacco: Never Used    Alcohol use No    Drug use: No    Sexual activity: Not on file     Other Topics Concern    Not on file     Social History Narrative    Breakfast: Cereal or oatmeal or grits; Coffee with milk and Splenda    Lunch: " "Baked chicken, veggie, sometimes rice; water or fruit juice    Dinner: Shrimp soup    Snacks: Rare    Eats out: 3x/wk    Water: 2 bottles (16 oz) per day     Family History   Problem Relation Age of Onset    Cancer Mother     Thyroid disease Mother     Cancer Sister     Cancer Sister        Current Outpatient Prescriptions:     amiodarone (PACERONE) 200 MG Tab, Take 100 mg by mouth once daily., Disp: , Rfl:     atorvastatin (LIPITOR) 20 MG tablet, Take 1 tablet (20 mg total) by mouth once daily., Disp: 90 tablet, Rfl: 3    bumetanide (BUMEX) 1 MG tablet, TK 1 T PO BID, Disp: , Rfl: 0    clopidogrel (PLAVIX) 75 mg tablet, TK 1 T PO QD, Disp: , Rfl: 1    loratadine (CLARITIN) 10 mg tablet, Take 1 tablet (10 mg total) by mouth once daily., Disp: , Rfl: 0    trospium (SANCTURA) 20 mg Tab tablet, Take 20 mg by mouth 2 (two) times daily., Disp: , Rfl: 3    spironolactone (ALDACTONE) 25 MG tablet, Take 1 tablet (25 mg total) by mouth once daily., Disp: 30 tablet, Rfl: 3    Review of patient's allergies indicates:   Allergen Reactions    Sulfa (sulfonamide antibiotics) Anaphylaxis     Review of Systems   Negative.     Objective:     /70 (BP Location: Right arm, Patient Position: Sitting, BP Method: Medium (Manual))   Pulse 66   Temp 97.8 °F (36.6 °C) (Tympanic)   Resp 18   Ht 5' 7" (1.702 m)   Wt 76.8 kg (169 lb 5 oz)   SpO2 97%   BMI 26.52 kg/m²     Physical Exam  GEN: A&O fully, NAD  PSYC: Normal affect  EXT: Trace ankle edema B    Lab Results   Component Value Date    WBC 6.03 01/10/2017    HGB 13.0 01/10/2017    HCT 39.5 01/10/2017     01/10/2017    CHOL 146 01/10/2017    TRIG 63 01/10/2017    HDL 57 01/10/2017    ALT 29 01/10/2017    AST 27 01/10/2017     01/10/2017    K 4.4 01/10/2017     01/10/2017    CREATININE 1.1 01/10/2017    BUN 25 (H) 01/10/2017    CO2 24 01/10/2017    TSH 0.716 01/10/2017       Assessment:   1. CHF: Well controlled. BP WNL. Will check BMP today. " Recommended restarting potassium 20 meq qd.       She will f/u with Dr. Dylon Arteaga for her CHF. She understands to bring all of her medications with her to        all of her appointments.     RTC in 3 months RE annual or sooner as needed

## 2017-12-12 ENCOUNTER — OFFICE VISIT (OUTPATIENT)
Dept: INTERNAL MEDICINE | Facility: CLINIC | Age: 82
End: 2017-12-12
Payer: MEDICARE

## 2017-12-12 VITALS
TEMPERATURE: 99 F | BODY MASS INDEX: 27.16 KG/M2 | RESPIRATION RATE: 18 BRPM | HEIGHT: 67 IN | WEIGHT: 173.06 LBS | HEART RATE: 80 BPM | DIASTOLIC BLOOD PRESSURE: 80 MMHG | SYSTOLIC BLOOD PRESSURE: 124 MMHG | OXYGEN SATURATION: 96 %

## 2017-12-12 DIAGNOSIS — R10.9 RIGHT FLANK PAIN, CHRONIC: Primary | ICD-10-CM

## 2017-12-12 DIAGNOSIS — E55.9 VITAMIN D DEFICIENCY: ICD-10-CM

## 2017-12-12 DIAGNOSIS — G89.29 RIGHT FLANK PAIN, CHRONIC: Primary | ICD-10-CM

## 2017-12-12 DIAGNOSIS — E86.0 DEHYDRATION: ICD-10-CM

## 2017-12-12 PROCEDURE — 99999 PR PBB SHADOW E&M-EST. PATIENT-LVL III: CPT | Mod: PBBFAC,,, | Performed by: INTERNAL MEDICINE

## 2017-12-12 PROCEDURE — 99213 OFFICE O/P EST LOW 20 MIN: CPT | Mod: PBBFAC,PN | Performed by: INTERNAL MEDICINE

## 2017-12-12 PROCEDURE — 99214 OFFICE O/P EST MOD 30 MIN: CPT | Mod: S$PBB,,, | Performed by: INTERNAL MEDICINE

## 2017-12-12 RX ORDER — DICLOFENAC SODIUM 10 MG/G
2 GEL TOPICAL DAILY
Qty: 100 G | Refills: 0 | Status: SHIPPED | OUTPATIENT
Start: 2017-12-12 | End: 2018-12-13

## 2017-12-12 NOTE — PROGRESS NOTES
"Subjective:      Patient ID: Amy Ding is a 83 y.o. female.    Chief Complaint: No chief complaint on file.      HPI  Ms. Ding is a patient of mine, who presents for f/u on CHF & HTN.     She reports seeing Dr. Arteaga's nurse for her BP.     Medications reviewed and she reports not taking spironolactone.     She reports feeling "ok". She endorses occasional (QD, each lasting ~30 min) soreness in her right flank since her partial nephrectomy, for which she takes OTC Tylenol for.     She reports some weight gain, which she attributes to Thanksgiving Holidays.     Past Medical History:   Diagnosis Date    Atrial fibrillation     PAROXYSMAL    Bilateral renal masses     s/p R partial nephrectomy; L kidney intact; Now on Trospium CL 20 mg BID    CHF (congestive heart failure)     Heart failure     Grade 1 diastolic dysfunction; EF 60% per ECHO 4/28/17    High cholesterol     HTN (hypertension)     Stroke     TIA - 2015    TIA (transient ischemic attack) 2014    [facility], where she was started on Plav[facility]     Past Surgical History:   Procedure Laterality Date    HERNIA REPAIR      right partial nephrectomy Right 2015     Social History     Social History    Marital status:      Spouse name: N/A    Number of children: N/A    Years of education: N/A     Occupational History    disabled      Social History Main Topics    Smoking status: Never Smoker    Smokeless tobacco: Never Used    Alcohol use No    Drug use: No    Sexual activity: Not on file     Other Topics Concern    Not on file     Social History Narrative    Breakfast: Cereal or oatmeal or grits; Coffee with milk and Splenda    Lunch: Baked chicken, veggie, sometimes rice; water or fruit juice    Dinner: Shrimp soup    Snacks: Rare    Eats out: 3x/wk    Water: 2 bottles (16 oz) per day     Family History   Problem Relation Age of Onset    Cancer Mother     Thyroid disease Mother     Cancer Sister     Cancer Sister        Current " "Outpatient Prescriptions:     amiodarone (PACERONE) 200 MG Tab, Take 100 mg by mouth once daily., Disp: , Rfl:     atorvastatin (LIPITOR) 20 MG tablet, Take 1 tablet (20 mg total) by mouth once daily., Disp: 90 tablet, Rfl: 3    bumetanide (BUMEX) 1 MG tablet, TK 1 T PO BID, Disp: , Rfl: 0    clopidogrel (PLAVIX) 75 mg tablet, TK 1 T PO QD, Disp: , Rfl: 1    loratadine (CLARITIN) 10 mg tablet, Take 1 tablet (10 mg total) by mouth once daily., Disp: , Rfl: 0    potassium chloride SA (K-DUR,KLOR-CON) 20 MEQ tablet, TK 1 T PO QD, Disp: 90 tablet, Rfl: 3    trospium (SANCTURA) 20 mg Tab tablet, Take 20 mg by mouth 2 (two) times daily., Disp: , Rfl: 3    diclofenac sodium (VOLTAREN) 1 % Gel, Apply 2 g topically once daily., Disp: 100 g, Rfl: 0    Review of patient's allergies indicates:   Allergen Reactions    Sulfa (sulfonamide antibiotics) Anaphylaxis        Review of Systems   Negative.     Objective:     /80 (BP Location: Right arm, Patient Position: Sitting, BP Method: Large (Manual))   Pulse 80   Temp 98.7 °F (37.1 °C) (Tympanic)   Resp 18   Ht 5' 7" (1.702 m)   Wt 78.5 kg (173 lb 1 oz)   SpO2 96%   BMI 27.11 kg/m²     Physical Exam  GEN: A&O fully, NAD  PSYC: Normal affect      Lab Results   Component Value Date    WBC 6.03 01/10/2017    HGB 13.0 01/10/2017    HCT 39.5 01/10/2017     01/10/2017    CHOL 146 01/10/2017    TRIG 63 01/10/2017    HDL 57 01/10/2017    ALT 29 01/10/2017    AST 27 01/10/2017     09/06/2017    K 4.5 09/06/2017     09/06/2017    CREATININE 1.4 09/06/2017    BUN 33 (H) 09/06/2017    CO2 27 09/06/2017    TSH 0.716 01/10/2017       Assessment:      1. Right flank pain, chronic: Likely related to her right partial nephrectomy. Controlled with prn Tylenol, but she is open to getting better pain control with occasional diclofenac gel prn breakthrough pain.      Plan:   Right flank pain, chronic  -     diclofenac sodium (VOLTAREN) 1 % Gel; Apply 2 g " topically once daily.  Dispense: 100 g; Refill: 0    Dehydration  -     Comprehensive metabolic panel; Future; Expected date: 01/12/2018    Vitamin D deficiency  -     Vitamin D; Future; Expected date: 01/12/2018      RTC in 3 months RE dehydration, vitamin D deficiency & overweight or sooner as needed

## 2018-02-26 ENCOUNTER — PATIENT OUTREACH (OUTPATIENT)
Dept: ADMINISTRATIVE | Facility: HOSPITAL | Age: 83
End: 2018-02-26

## 2018-03-09 PROBLEM — N39.0 UTI (URINARY TRACT INFECTION): Status: ACTIVE | Noted: 2018-03-08

## 2018-03-14 ENCOUNTER — LAB VISIT (OUTPATIENT)
Dept: LAB | Facility: HOSPITAL | Age: 83
End: 2018-03-14
Attending: INTERNAL MEDICINE
Payer: MEDICARE

## 2018-03-14 DIAGNOSIS — E86.0 DEHYDRATION: ICD-10-CM

## 2018-03-14 DIAGNOSIS — E55.9 VITAMIN D DEFICIENCY: ICD-10-CM

## 2018-03-14 LAB
25(OH)D3+25(OH)D2 SERPL-MCNC: 40 NG/ML
ALBUMIN SERPL BCP-MCNC: 3.4 G/DL
ALP SERPL-CCNC: 104 U/L
ALT SERPL W/O P-5'-P-CCNC: 33 U/L
ANION GAP SERPL CALC-SCNC: 8 MMOL/L
AST SERPL-CCNC: 36 U/L
BILIRUB SERPL-MCNC: 0.4 MG/DL
BUN SERPL-MCNC: 24 MG/DL
CALCIUM SERPL-MCNC: 9 MG/DL
CHLORIDE SERPL-SCNC: 104 MMOL/L
CO2 SERPL-SCNC: 27 MMOL/L
CREAT SERPL-MCNC: 1.1 MG/DL
EST. GFR  (AFRICAN AMERICAN): 53.3 ML/MIN/1.73 M^2
EST. GFR  (NON AFRICAN AMERICAN): 46.2 ML/MIN/1.73 M^2
GLUCOSE SERPL-MCNC: 80 MG/DL
POTASSIUM SERPL-SCNC: 4.1 MMOL/L
PROT SERPL-MCNC: 7.3 G/DL
SODIUM SERPL-SCNC: 139 MMOL/L

## 2018-03-14 PROCEDURE — 36415 COLL VENOUS BLD VENIPUNCTURE: CPT | Mod: PO

## 2018-03-14 PROCEDURE — 82306 VITAMIN D 25 HYDROXY: CPT

## 2018-03-14 PROCEDURE — 80053 COMPREHEN METABOLIC PANEL: CPT

## 2018-03-21 ENCOUNTER — OFFICE VISIT (OUTPATIENT)
Dept: INTERNAL MEDICINE | Facility: CLINIC | Age: 83
End: 2018-03-21
Payer: MEDICARE

## 2018-03-21 VITALS
SYSTOLIC BLOOD PRESSURE: 129 MMHG | WEIGHT: 167.56 LBS | OXYGEN SATURATION: 98 % | BODY MASS INDEX: 26.3 KG/M2 | RESPIRATION RATE: 18 BRPM | DIASTOLIC BLOOD PRESSURE: 63 MMHG | HEART RATE: 68 BPM | HEIGHT: 67 IN | TEMPERATURE: 97 F

## 2018-03-21 DIAGNOSIS — G89.29 RIGHT FLANK PAIN, CHRONIC: Primary | ICD-10-CM

## 2018-03-21 DIAGNOSIS — I11.0 HYPERTENSIVE CHF: ICD-10-CM

## 2018-03-21 DIAGNOSIS — E86.0 DEHYDRATION: ICD-10-CM

## 2018-03-21 DIAGNOSIS — R10.9 RIGHT FLANK PAIN, CHRONIC: Primary | ICD-10-CM

## 2018-03-21 PROBLEM — N39.0 UTI (URINARY TRACT INFECTION): Status: RESOLVED | Noted: 2018-03-08 | Resolved: 2018-03-21

## 2018-03-21 PROCEDURE — 99214 OFFICE O/P EST MOD 30 MIN: CPT | Mod: S$PBB,,, | Performed by: INTERNAL MEDICINE

## 2018-03-21 PROCEDURE — 99213 OFFICE O/P EST LOW 20 MIN: CPT | Mod: PBBFAC,PN | Performed by: INTERNAL MEDICINE

## 2018-03-21 PROCEDURE — 99999 PR PBB SHADOW E&M-EST. PATIENT-LVL III: CPT | Mod: PBBFAC,,, | Performed by: INTERNAL MEDICINE

## 2018-03-21 PROCEDURE — 81000 URINALYSIS NONAUTO W/SCOPE: CPT | Mod: PBBFAC,PN | Performed by: INTERNAL MEDICINE

## 2018-03-21 RX ORDER — HYDRALAZINE HYDROCHLORIDE AND ISOSORBIDE DINITRATE 37.5; 2 MG/1; MG/1
TABLET, FILM COATED ORAL
COMMUNITY
Start: 2018-02-19 | End: 2018-12-13 | Stop reason: DRUGHIGH

## 2018-03-21 NOTE — PROGRESS NOTES
Subjective:      Patient ID: Amy Ding is a 84 y.o. female.    Chief Complaint: Follow-up (3 months**) and Flank Pain (chronic**)      HPI     Ms. Amy Ding is a patient of Chicho Black MD, who presents for f/u on chronic, intermittent right flank pain from her right partial nephrectomy, which has been stable.    Labs reviewed, which reveal improvement from lower functioning CKD stage 3 to a higher eGFR stage 3 CKD, likely 2/2 increased water intake as her BUN also decreased.     She reports being treated for some kidney problem by her urologist, Dr. Cardona, but is not sure what it was and is not sure what the medication she was prescribed is called. She thought her urologist was going to send the records here. No dysuria. + urinary frequency.      Past Medical History:   Diagnosis Date    Atrial fibrillation     PAROXYSMAL    Bilateral renal masses     s/p R partial (lower pole) nephrectomy (2/2 malignant neoplasm); L kidney intact; Now on Trospium CL 20 mg BID; followed by Kody Cardona MD Urology    CKD (chronic kidney disease) stage 3, GFR 30-59 ml/min     Diastolic heart failure     Grade 1 diastolic dysfunction; EF 60% per ECHO 4/28/17    High cholesterol     HTN (hypertension)     Stroke     TIA - 2015    TIA (transient ischemic attack) 2014    [facility], where she was started on Plavix     Past Surgical History:   Procedure Laterality Date    HERNIA REPAIR      right partial nephrectomy Right 2015     Social History     Social History    Marital status:      Spouse name: N/A    Number of children: N/A    Years of education: N/A     Occupational History    disabled      Social History Main Topics    Smoking status: Never Smoker    Smokeless tobacco: Never Used    Alcohol use No    Drug use: No    Sexual activity: Not on file     Other Topics Concern    Not on file     Social History Narrative    Breakfast: Cereal or oatmeal or grits; Coffee with milk and Splenda    Lunch: Baked  "chicken, veggie, sometimes rice; water or fruit juice    Dinner: Shrimp soup    Snacks: Rare    Eats out: 3x/wk    Water: 2 bottles (16 oz) per day     Family History   Problem Relation Age of Onset    Cancer Mother     Thyroid disease Mother     Cancer Sister     Cancer Sister        Current Outpatient Prescriptions:     amiodarone (PACERONE) 200 MG Tab, Take 100 mg by mouth once daily., Disp: , Rfl:     BIDIL 20-37.5 mg Tab, , Disp: , Rfl:     bumetanide (BUMEX) 1 MG tablet, TK 1 T PO BID, Disp: , Rfl: 0    clopidogrel (PLAVIX) 75 mg tablet, TK 1 T PO QD, Disp: , Rfl: 1    diclofenac sodium (VOLTAREN) 1 % Gel, Apply 2 g topically once daily., Disp: 100 g, Rfl: 0    loratadine (CLARITIN) 10 mg tablet, Take 1 tablet (10 mg total) by mouth once daily., Disp: , Rfl: 0    potassium chloride SA (K-DUR,KLOR-CON) 20 MEQ tablet, TK 1 T PO QD, Disp: 90 tablet, Rfl: 3    trospium (SANCTURA) 20 mg Tab tablet, Take 20 mg by mouth 2 (two) times daily., Disp: , Rfl: 3    atorvastatin (LIPITOR) 20 MG tablet, Take 1 tablet (20 mg total) by mouth once daily., Disp: 90 tablet, Rfl: 3    Review of patient's allergies indicates:   Allergen Reactions    Sulfa (sulfonamide antibiotics) Anaphylaxis        Review of Systems   Negative    Objective:     /63 (BP Location: Left arm, Patient Position: Sitting, BP Method: Large (Automatic))   Pulse 68   Temp 97 °F (36.1 °C) (Tympanic)   Resp 18   Ht 5' 7" (1.702 m)   Wt 76 kg (167 lb 8.8 oz)   SpO2 98%   BMI 26.24 kg/m²     Physical Exam  GEN: A&O fully, NAD  PSYC: Normal affect      Lab Results   Component Value Date    WBC 6.03 01/10/2017    HGB 13.0 01/10/2017    HCT 39.5 01/10/2017     01/10/2017    CHOL 146 01/10/2017    TRIG 63 01/10/2017    HDL 57 01/10/2017    LDLCALC 76.4 01/10/2017    ALT 33 03/14/2018    AST 36 03/14/2018     03/14/2018    K 4.1 03/14/2018     03/14/2018    CREATININE 1.1 03/14/2018    BUN 24 (H) 03/14/2018    CO2 27 " 03/14/2018    TSH 0.716 01/10/2017       Assessment:      1. Right flank pain, chronic: Stable. Continue current medical regimen.   2. Dehydration: High BUN on 3/14/18. Will check CMP today.    3. Hypertensive CHF: Stable BP. No sob. Continue current medical regimen.   4.      CKD3: Improving. As above.     Plan:   Right flank pain, chronic  -     POCT Urinalysis    Dehydration  -     Comprehensive metabolic panel; Future; Expected date: 03/21/2018    Hypertensive CHF  -     Lipid panel; Future; Expected date: 03/21/2018        Follow-up in about 6 months (around 9/21/2018), or if symptoms worsen or fail to improve, for FU on CHF.

## 2018-03-22 LAB
BILIRUB SERPL-MCNC: NEGATIVE MG/DL
BLOOD, POC UA: NEGATIVE
GLUCOSE UR QL STRIP: NORMAL
KETONES UR QL STRIP: NEGATIVE
LEUKOCYTE ESTERASE URINE, POC: NEGATIVE
NITRITE, POC UA: NEGATIVE
PH, POC UA: 5
PROTEIN, POC: 30
SPECIFIC GRAVITY, POC UA: 1.02
UROBILINOGEN, POC UA: NORMAL

## 2018-04-25 ENCOUNTER — TELEPHONE (OUTPATIENT)
Dept: INTERNAL MEDICINE | Facility: CLINIC | Age: 83
End: 2018-04-25

## 2018-05-15 ENCOUNTER — TELEPHONE (OUTPATIENT)
Dept: INTERNAL MEDICINE | Facility: CLINIC | Age: 83
End: 2018-05-15

## 2018-05-15 DIAGNOSIS — I11.0 HYPERTENSIVE HEART DISEASE WITH CONGESTIVE HEART FAILURE, UNSPECIFIED HEART FAILURE TYPE: Chronic | ICD-10-CM

## 2018-05-15 DIAGNOSIS — Z13.220 SCREENING FOR HYPERCHOLESTEROLEMIA: Primary | ICD-10-CM

## 2018-05-15 DIAGNOSIS — R79.9 ELEVATED BUN: ICD-10-CM

## 2018-05-15 DIAGNOSIS — I10 ESSENTIAL HYPERTENSION: ICD-10-CM

## 2018-05-15 NOTE — TELEPHONE ENCOUNTER
----- Message from Sari Ding sent at 5/15/2018  8:31 AM CDT -----  Contact: Patient   Patient stated that some called her and was told that she needed to have labs done but there is no orders in the systems, Please call her at [phone].    Thanks  Td

## 2018-06-01 ENCOUNTER — OFFICE VISIT (OUTPATIENT)
Dept: INTERNAL MEDICINE | Facility: CLINIC | Age: 83
End: 2018-06-01
Payer: MEDICARE

## 2018-06-01 VITALS
HEART RATE: 71 BPM | SYSTOLIC BLOOD PRESSURE: 130 MMHG | OXYGEN SATURATION: 97 % | RESPIRATION RATE: 20 BRPM | DIASTOLIC BLOOD PRESSURE: 62 MMHG | TEMPERATURE: 99 F | BODY MASS INDEX: 24.99 KG/M2 | WEIGHT: 159.19 LBS | HEIGHT: 67 IN

## 2018-06-01 DIAGNOSIS — R07.89 OTHER CHEST PAIN: Primary | ICD-10-CM

## 2018-06-01 DIAGNOSIS — R82.90 FOUL SMELLING URINE: ICD-10-CM

## 2018-06-01 LAB
BACTERIA #/AREA URNS AUTO: ABNORMAL /HPF
MICROSCOPIC COMMENT: ABNORMAL
RBC #/AREA URNS AUTO: 8 /HPF (ref 0–4)
SQUAMOUS #/AREA URNS AUTO: 3 /HPF
WBC #/AREA URNS AUTO: 49 /HPF (ref 0–5)
WBC CLUMPS UR QL AUTO: ABNORMAL

## 2018-06-01 PROCEDURE — 99214 OFFICE O/P EST MOD 30 MIN: CPT | Mod: S$PBB,,, | Performed by: INTERNAL MEDICINE

## 2018-06-01 PROCEDURE — 87086 URINE CULTURE/COLONY COUNT: CPT

## 2018-06-01 PROCEDURE — 81001 URINALYSIS AUTO W/SCOPE: CPT

## 2018-06-01 PROCEDURE — 87088 URINE BACTERIA CULTURE: CPT

## 2018-06-01 PROCEDURE — 99213 OFFICE O/P EST LOW 20 MIN: CPT | Mod: PBBFAC,PN | Performed by: INTERNAL MEDICINE

## 2018-06-01 PROCEDURE — 99999 PR PBB SHADOW E&M-EST. PATIENT-LVL III: CPT | Mod: PBBFAC,,, | Performed by: INTERNAL MEDICINE

## 2018-06-01 PROCEDURE — 87077 CULTURE AEROBIC IDENTIFY: CPT

## 2018-06-01 PROCEDURE — 87186 SC STD MICRODIL/AGAR DIL: CPT

## 2018-06-01 NOTE — PROGRESS NOTES
Subjective:      Patient ID: Amy Ding is a 84 y.o. female.    Chief Complaint: Chest Pain      HPI     MsSuresh Ding is a patient of Chicho Black MD, who presents for intermittent right thoracic pain over the past few weeks, which seems to be worse this week, particularly underneath her right armpit. She has been taking ES Tylenol 500 mg po 2 tabs per day for her pain, which she last took yesterday. No SOB. No n/v. No diaphoresis. No cough. No recent falls. No trauma to the area.     No sneezing, but she endorses occasional sinus congestion. She denies taking anything for her nasal congestion.     She also endorses foul smelling urine over the past week. No dysuria. No cloudy urine. No fever, but intermittent +chills recently, which she attributed to the AC being too cold.     She reports recently cutting back on sweets.       Past Medical History:   Diagnosis Date    Atrial fibrillation     PAROXYSMAL    Bilateral renal masses     s/p R partial (lower pole) nephrectomy (2/2 malignant neoplasm); L kidney intact; Now on Trospium CL 20 mg BID; followed by Kody Cardona MD Urology    CKD (chronic kidney disease) stage 3, GFR 30-59 ml/min     Diastolic heart failure     Grade 1 diastolic dysfunction; EF 60% per ECHO 4/28/17    High cholesterol     HTN (hypertension)     Stroke     TIA - 2015    TIA (transient ischemic attack) 2014    [facility] where she was started on [facility]av[facility]     Past Surgical History:   Procedure Laterality Date    HERNIA REPAIR      right partial nephrectomy Right 2015     Social History     Social History    Marital status:      Spouse name: N/A    Number of children: N/A    Years of education: N/A     Occupational History    disabled      Social History Main Topics    Smoking status: Never Smoker    Smokeless tobacco: Never Used    Alcohol use No    Drug use: No    Sexual activity: Not on file     Other Topics Concern    Not on file     Social History Narrative     "Breakfast: Cereal or oatmeal or grits; Coffee with milk and Splenda    Lunch: Baked chicken, veggie, sometimes rice; water or fruit juice    Dinner: Shrimp soup    Snacks: Rare    Eats out: 3x/wk    Water: 2 bottles (16 oz) per day     Family History   Problem Relation Age of Onset    Cancer Mother     Thyroid disease Mother     Cancer Sister     Cancer Sister        Current Outpatient Prescriptions:     amiodarone (PACERONE) 200 MG Tab, Take 100 mg by mouth once daily., Disp: , Rfl:     BIDIL 20-37.5 mg Tab, , Disp: , Rfl:     bumetanide (BUMEX) 1 MG tablet, TK 1 T PO BID, Disp: , Rfl: 0    clopidogrel (PLAVIX) 75 mg tablet, TK 1 T PO QD, Disp: , Rfl: 1    diclofenac sodium (VOLTAREN) 1 % Gel, Apply 2 g topically once daily., Disp: 100 g, Rfl: 0    loratadine (CLARITIN) 10 mg tablet, Take 1 tablet (10 mg total) by mouth once daily., Disp: , Rfl: 0    potassium chloride SA (K-DUR,KLOR-CON) 20 MEQ tablet, TK 1 T PO QD, Disp: 90 tablet, Rfl: 3    trospium (SANCTURA) 20 mg Tab tablet, Take 20 mg by mouth 2 (two) times daily., Disp: , Rfl: 3    atorvastatin (LIPITOR) 20 MG tablet, Take 1 tablet (20 mg total) by mouth once daily., Disp: 90 tablet, Rfl: 3    Review of patient's allergies indicates:   Allergen Reactions    Sulfa (sulfonamide antibiotics) Anaphylaxis        Review of Systems   Negative    Objective:     /62 (BP Location: Left arm, Patient Position: Sitting, BP Method: Medium (Manual))   Pulse 71   Temp 98.8 °F (37.1 °C) (Tympanic)   Resp 20   Ht 5' 7" (1.702 m)   Wt 72.2 kg (159 lb 2.8 oz)   SpO2 97%   BMI 24.93 kg/m²     Physical Exam  GEN: A&O fully, NAD  PSYC: Normal affect  HEENT: OP: Clear, no LAD, no thyroid masses  CV: RRR, no M/G/R  PULM: CTA bilaterally, no wheezes, rales  MSK: Mild TTP of right chest and right back in sub-scapular region; No right axillary LAD      Lab Results   Component Value Date    WBC 6.03 01/10/2017    HGB 13.0 01/10/2017    HCT 39.5 01/10/2017    "  01/10/2017    CHOL 146 01/10/2017    TRIG 63 01/10/2017    HDL 57 01/10/2017    LDLCALC 76.4 01/10/2017    ALT 33 03/14/2018    AST 36 03/14/2018     03/14/2018    K 4.1 03/14/2018     03/14/2018    CREATININE 1.1 03/14/2018    BUN 24 (H) 03/14/2018    CO2 27 03/14/2018    TSH 0.716 01/10/2017       Assessment:      1. Other chest pain: Mild. Likely musculoskeletal. Will check CXR given recent chills and congestion, which she plans to schedule for Monday. I recommended tylenol 650 mg by mouth three times daily as needed and to avoid NSAIDS given her CKD, including buprofen, Motrin, Advil, Aleve, Naprosyn, naproxen, Aspirin, Goodies, [place], BC's powder, oral diclofenac, Mobic, meloxicam, etc. Also, she may consider over-the-counter turmeric 500 mg 2 capsules by mouth daily. If these measures are not helpful, we will recommend physical therapy.   2.      Foul smelling urine: Will check u/a and Ucx.    Plan:   Other chest pain  -     X-Ray Chest PA And Lateral; Future; Expected date: 06/01/2018    Foul smelling urine  -     Urinalysis Microscopic  -     Urine culture        Follow-up if symptoms worsen or fail to improve.       ADDENDUM:  Ucx + for pan-sensitive E. coli. Called pt today and she reports the foul smell has since resolved. She denies dysuria/frequency and understands to call us if she has any sx so we can send an ABx to her pharmacy. She voiced understanding.

## 2018-06-03 LAB — BACTERIA UR CULT: NORMAL

## 2018-06-04 ENCOUNTER — APPOINTMENT (OUTPATIENT)
Dept: RADIOLOGY | Facility: HOSPITAL | Age: 83
End: 2018-06-04
Attending: INTERNAL MEDICINE
Payer: MEDICARE

## 2018-06-04 DIAGNOSIS — R07.89 OTHER CHEST PAIN: ICD-10-CM

## 2018-06-04 PROCEDURE — 71046 X-RAY EXAM CHEST 2 VIEWS: CPT | Mod: TC,PO

## 2018-06-04 PROCEDURE — 71046 X-RAY EXAM CHEST 2 VIEWS: CPT | Mod: 26,,, | Performed by: RADIOLOGY

## 2018-08-08 ENCOUNTER — TELEPHONE (OUTPATIENT)
Dept: INTERNAL MEDICINE | Facility: CLINIC | Age: 83
End: 2018-08-08

## 2018-08-27 ENCOUNTER — PATIENT OUTREACH (OUTPATIENT)
Dept: ADMINISTRATIVE | Facility: HOSPITAL | Age: 83
End: 2018-08-27

## 2018-08-27 NOTE — LETTER
August 27, 2018        Amy S Timur  [address] [place] [id number]      Dear Ms. Ding,    You have an upcoming appointment with Chicho Black MD on 09/10/18.      Your chart is indicating you may be due for the following and I will be happy to assist you in scheduling any needed appointments:  Health Maintenance Due   Topic    Lipid Panel     Influenza Vaccine           If you have had any of the above done at another facility, please bring the records or information with you so that your record at [facility] will be complete.    We will be happy to assist you with scheduling any necessary appointments or you may contact the [facility] appointment desk at [phone] to schedule at your convenience.     Thank you for choosing [facility] for your healthcare needs,    Shellie ENNIS LPN Care Coordinator  [facility]  [phone]

## 2018-09-10 ENCOUNTER — PATIENT OUTREACH (OUTPATIENT)
Dept: ADMINISTRATIVE | Facility: HOSPITAL | Age: 83
End: 2018-09-10

## 2018-09-10 ENCOUNTER — LAB VISIT (OUTPATIENT)
Dept: LAB | Facility: HOSPITAL | Age: 83
End: 2018-09-10
Attending: INTERNAL MEDICINE
Payer: MEDICARE

## 2018-09-10 DIAGNOSIS — I11.0 HYPERTENSIVE HEART DISEASE WITH CONGESTIVE HEART FAILURE, UNSPECIFIED HEART FAILURE TYPE: Chronic | ICD-10-CM

## 2018-09-10 DIAGNOSIS — R79.9 ELEVATED BUN: ICD-10-CM

## 2018-09-10 DIAGNOSIS — I10 ESSENTIAL HYPERTENSION: ICD-10-CM

## 2018-09-10 LAB
ALBUMIN SERPL BCP-MCNC: 3.3 G/DL
ALP SERPL-CCNC: 98 U/L
ALT SERPL W/O P-5'-P-CCNC: 20 U/L
ANION GAP SERPL CALC-SCNC: 11 MMOL/L
AST SERPL-CCNC: 24 U/L
BILIRUB SERPL-MCNC: 0.4 MG/DL
BUN SERPL-MCNC: 34 MG/DL
CALCIUM SERPL-MCNC: 8.8 MG/DL
CHLORIDE SERPL-SCNC: 106 MMOL/L
CHOLEST SERPL-MCNC: 183 MG/DL
CHOLEST/HDLC SERPL: 3.3 {RATIO}
CO2 SERPL-SCNC: 24 MMOL/L
CREAT SERPL-MCNC: 1.4 MG/DL
EST. GFR  (AFRICAN AMERICAN): 39.8 ML/MIN/1.73 M^2
EST. GFR  (NON AFRICAN AMERICAN): 34.5 ML/MIN/1.73 M^2
GLUCOSE SERPL-MCNC: 106 MG/DL
HDLC SERPL-MCNC: 55 MG/DL
HDLC SERPL: 30.1 %
LDLC SERPL CALC-MCNC: 110.8 MG/DL
NONHDLC SERPL-MCNC: 128 MG/DL
POTASSIUM SERPL-SCNC: 4.5 MMOL/L
PROT SERPL-MCNC: 7.2 G/DL
SODIUM SERPL-SCNC: 141 MMOL/L
TRIGL SERPL-MCNC: 86 MG/DL

## 2018-09-10 PROCEDURE — 80053 COMPREHEN METABOLIC PANEL: CPT

## 2018-09-10 PROCEDURE — 36415 COLL VENOUS BLD VENIPUNCTURE: CPT | Mod: PO

## 2018-09-10 PROCEDURE — 80061 LIPID PANEL: CPT

## 2018-09-24 ENCOUNTER — OFFICE VISIT (OUTPATIENT)
Dept: INTERNAL MEDICINE | Facility: CLINIC | Age: 83
End: 2018-09-24
Payer: MEDICARE

## 2018-09-24 ENCOUNTER — LAB VISIT (OUTPATIENT)
Dept: LAB | Facility: HOSPITAL | Age: 83
End: 2018-09-24
Attending: INTERNAL MEDICINE
Payer: MEDICARE

## 2018-09-24 VITALS
DIASTOLIC BLOOD PRESSURE: 56 MMHG | BODY MASS INDEX: 24.29 KG/M2 | OXYGEN SATURATION: 96 % | SYSTOLIC BLOOD PRESSURE: 122 MMHG | HEIGHT: 67 IN | HEART RATE: 66 BPM | WEIGHT: 154.75 LBS | TEMPERATURE: 97 F | RESPIRATION RATE: 18 BRPM

## 2018-09-24 DIAGNOSIS — R53.83 OTHER FATIGUE: ICD-10-CM

## 2018-09-24 DIAGNOSIS — R79.9 ELEVATED BUN: ICD-10-CM

## 2018-09-24 DIAGNOSIS — K59.00 CONSTIPATION, UNSPECIFIED CONSTIPATION TYPE: ICD-10-CM

## 2018-09-24 DIAGNOSIS — R82.90 MALODOROUS URINE: Primary | ICD-10-CM

## 2018-09-24 LAB
ALBUMIN SERPL BCP-MCNC: 3.6 G/DL
ALP SERPL-CCNC: 105 U/L
ALT SERPL W/O P-5'-P-CCNC: 26 U/L
ANION GAP SERPL CALC-SCNC: 9 MMOL/L
AST SERPL-CCNC: 25 U/L
BACTERIA #/AREA URNS AUTO: ABNORMAL /HPF
BASOPHILS # BLD AUTO: 0.02 K/UL
BASOPHILS NFR BLD: 0.4 %
BILIRUB SERPL-MCNC: 0.3 MG/DL
BNP SERPL-MCNC: 31 PG/ML
BUN SERPL-MCNC: 34 MG/DL
CALCIUM SERPL-MCNC: 9.4 MG/DL
CHLORIDE SERPL-SCNC: 106 MMOL/L
CO2 SERPL-SCNC: 26 MMOL/L
CREAT SERPL-MCNC: 1.3 MG/DL
DIFFERENTIAL METHOD: ABNORMAL
EOSINOPHIL # BLD AUTO: 0 K/UL
EOSINOPHIL NFR BLD: 0.2 %
ERYTHROCYTE [DISTWIDTH] IN BLOOD BY AUTOMATED COUNT: 13.5 %
EST. GFR  (AFRICAN AMERICAN): 43.5 ML/MIN/1.73 M^2
EST. GFR  (NON AFRICAN AMERICAN): 37.8 ML/MIN/1.73 M^2
GLUCOSE SERPL-MCNC: 57 MG/DL
HCT VFR BLD AUTO: 41.1 %
HGB BLD-MCNC: 13 G/DL
HYALINE CASTS UR QL AUTO: 4 /LPF
IMM GRANULOCYTES # BLD AUTO: 0.02 K/UL
IMM GRANULOCYTES NFR BLD AUTO: 0.4 %
LYMPHOCYTES # BLD AUTO: 1.7 K/UL
LYMPHOCYTES NFR BLD: 30.8 %
MCH RBC QN AUTO: 30.9 PG
MCHC RBC AUTO-ENTMCNC: 31.6 G/DL
MCV RBC AUTO: 98 FL
MICROSCOPIC COMMENT: ABNORMAL
MONOCYTES # BLD AUTO: 0.8 K/UL
MONOCYTES NFR BLD: 15 %
NEUTROPHILS # BLD AUTO: 2.9 K/UL
NEUTROPHILS NFR BLD: 53.2 %
NRBC BLD-RTO: 0 /100 WBC
PLATELET # BLD AUTO: 187 K/UL
PMV BLD AUTO: 10.3 FL
POTASSIUM SERPL-SCNC: 4.2 MMOL/L
PROT SERPL-MCNC: 7.7 G/DL
RBC # BLD AUTO: 4.21 M/UL
RBC #/AREA URNS AUTO: 1 /HPF (ref 0–4)
SODIUM SERPL-SCNC: 141 MMOL/L
SQUAMOUS #/AREA URNS AUTO: 1 /HPF
WBC # BLD AUTO: 5.48 K/UL
WBC #/AREA URNS AUTO: 38 /HPF (ref 0–5)
WBC CLUMPS UR QL AUTO: ABNORMAL

## 2018-09-24 PROCEDURE — 87077 CULTURE AEROBIC IDENTIFY: CPT

## 2018-09-24 PROCEDURE — 36415 COLL VENOUS BLD VENIPUNCTURE: CPT | Mod: PO

## 2018-09-24 PROCEDURE — 85025 COMPLETE CBC W/AUTO DIFF WBC: CPT

## 2018-09-24 PROCEDURE — 87088 URINE BACTERIA CULTURE: CPT

## 2018-09-24 PROCEDURE — 99999 PR PBB SHADOW E&M-EST. PATIENT-LVL III: CPT | Mod: PBBFAC,,, | Performed by: INTERNAL MEDICINE

## 2018-09-24 PROCEDURE — 87186 SC STD MICRODIL/AGAR DIL: CPT

## 2018-09-24 PROCEDURE — 81001 URINALYSIS AUTO W/SCOPE: CPT

## 2018-09-24 PROCEDURE — 87086 URINE CULTURE/COLONY COUNT: CPT

## 2018-09-24 PROCEDURE — 99213 OFFICE O/P EST LOW 20 MIN: CPT | Mod: PBBFAC,PN | Performed by: INTERNAL MEDICINE

## 2018-09-24 PROCEDURE — 83880 ASSAY OF NATRIURETIC PEPTIDE: CPT

## 2018-09-24 PROCEDURE — 99214 OFFICE O/P EST MOD 30 MIN: CPT | Mod: S$PBB,,, | Performed by: INTERNAL MEDICINE

## 2018-09-24 PROCEDURE — 80053 COMPREHEN METABOLIC PANEL: CPT

## 2018-09-24 RX ORDER — DOCUSATE SODIUM 100 MG/1
100 CAPSULE, LIQUID FILLED ORAL 3 TIMES DAILY PRN
Qty: 30 CAPSULE | Refills: 3 | Status: SHIPPED | OUTPATIENT
Start: 2018-09-24 | End: 2020-03-17

## 2018-09-24 NOTE — PROGRESS NOTES
Subjective:      Patient ID: Amy Ding is a 84 y.o. female.    Chief Complaint: Follow-up (6 months**)      HPI     Ms. Amy Ding is a patient of Chicho Black MD, who presents for f/u on HTN.     She reports having malodorous urine if she is not drinking 48 oz water/day. No dysuria. +constipation. Last BM yesterday. She endorses having to strain to have a BM. No n/v.       Past Medical History:   Diagnosis Date    Atrial fibrillation     PAROXYSMAL    Bilateral renal masses     s/p R partial (lower pole) nephrectomy (2/2 malignant neoplasm); L kidney intact; Now on Trospium CL 20 mg BID; followed by Kody Cardona MD Urology    CKD (chronic kidney disease) stage 3, GFR 30-59 ml/min     Diastolic heart failure     Grade 1 diastolic dysfunction; EF 60% per ECHO 4/28/17    High cholesterol     HTN (hypertension)     Stroke     TIA - 2015    TIA (transient ischemic attack) 2014    [facility] where she was started on [facility]     Past Surgical History:   Procedure Laterality Date    HERNIA REPAIR      right partial nephrectomy Right 2015     Social History     Socioeconomic History    Marital status:      Spouse name: Not on file    Number of children: Not on file    Years of education: Not on file    Highest education level: Not on file   Social Needs    Financial resource strain: Not on file    Food insecurity - worry: Not on file    Food insecurity - inability: Not on file    Transportation needs - medical: Not on file    Transportation needs - non-medical: Not on file   Occupational History    Occupation: disabled   Tobacco Use    Smoking status: Never Smoker    Smokeless tobacco: Never Used   Substance and Sexual Activity    Alcohol use: No    Drug use: No    Sexual activity: Not on file   Other Topics Concern    Not on file   Social History Narrative    Breakfast: Cereal or oatmeal or grits; Coffee with milk and Splenda    Lunch: Baked chicken, veggie, sometimes rice; water or  "fruit juice    Dinner: Shrimp soup    Snacks: Rare    Eats out: 3x/wk    Water: 2 bottles (16 oz) per day     Family History   Problem Relation Age of Onset    Cancer Mother     Thyroid disease Mother     Cancer Sister     Cancer Sister        Current Outpatient Medications:     amiodarone (PACERONE) 200 MG Tab, Take 100 mg by mouth once daily., Disp: , Rfl:     BIDIL 20-37.5 mg Tab, , Disp: , Rfl:     bumetanide (BUMEX) 1 MG tablet, TK 1 T PO BID, Disp: , Rfl: 0    clopidogrel (PLAVIX) 75 mg tablet, TK 1 T PO QD, Disp: , Rfl: 1    diclofenac sodium (VOLTAREN) 1 % Gel, Apply 2 g topically once daily., Disp: 100 g, Rfl: 0    potassium chloride SA (K-DUR,KLOR-CON) 20 MEQ tablet, TK 1 T PO QD, Disp: 90 tablet, Rfl: 3    trospium (SANCTURA) 20 mg Tab tablet, Take 20 mg by mouth 2 (two) times daily., Disp: , Rfl: 3    atorvastatin (LIPITOR) 20 MG tablet, Take 1 tablet (20 mg total) by mouth once daily., Disp: 90 tablet, Rfl: 3    docusate sodium (COLACE) 100 MG capsule, Take 1 capsule (100 mg total) by mouth 3 (three) times daily as needed for Constipation., Disp: 30 capsule, Rfl: 3    loratadine (CLARITIN) 10 mg tablet, Take 1 tablet (10 mg total) by mouth once daily., Disp: , Rfl: 0    Review of patient's allergies indicates:   Allergen Reactions    Sulfa (sulfonamide antibiotics) Anaphylaxis        Review of Systems   All remaining systems negative    Objective:     BP (!) 122/56 (BP Location: Left arm, Patient Position: Sitting, BP Method: Large (Manual))   Pulse 66   Temp 97.2 °F (36.2 °C) (Tympanic)   Resp 18   Ht 5' 7" (1.702 m)   Wt 70.2 kg (154 lb 12.2 oz)   SpO2 96%   BMI 24.24 kg/m²     Physical Exam  GEN: A&O fully, NAD  PSYC: Normal affect  GI: Soft, +TTP in pre-pubic and epigastric regions; ND, normal bowel sounds  EXT: No C/C/E, normal DP pulses bilaterally      Lab Results   Component Value Date    WBC 6.03 01/10/2017    HGB 13.0 01/10/2017    HCT 39.5 01/10/2017     " 01/10/2017    CHOL 183 09/10/2018    TRIG 86 09/10/2018    HDL 55 09/10/2018    LDLCALC 110.8 09/10/2018    ALT 20 09/10/2018    AST 24 09/10/2018     09/10/2018    K 4.5 09/10/2018     09/10/2018    CREATININE 1.4 09/10/2018    BUN 34 (H) 09/10/2018    CO2 24 09/10/2018    CALCIUM 8.8 09/10/2018       Assessment:      1. Malodorous urine: May be 2/2 UTI. Will check u/a & Ucx.    2. Constipation, unspecified constipation type: Likely 2/2 dehydration 2/2 UTI vs poor intake (<48 oz/d)   3. Other fatigue: Likely 2/2 chronic dehydration. DDx includes dCHF. Will check BNP. Encouraged to increase her water intake to 70 oz/d.   4. Elevated BUN: As above. Will check CBC to r/o GIB. Likely 2/2 dehydration.    5.      HM: She declines flu vax today.    Plan:   Malodorous urine  -     Urinalysis Microscopic  -     Urine culture    Constipation, unspecified constipation type  -     docusate sodium (COLACE) 100 MG capsule; Take 1 capsule (100 mg total) by mouth 3 (three) times daily as needed for Constipation.  Dispense: 30 capsule; Refill: 3    Other fatigue  -     Brain natriuretic peptide; Future; Expected date: 09/24/2018    Elevated BUN  -     CBC auto differential; Future; Expected date: 09/24/2018  -     Comprehensive metabolic panel; Future; Expected date: 09/24/2018        Follow-up in about 3 months (around 12/24/2018), or if symptoms worsen or fail to improve, for FU on constipation, nausea, rectal pain.    I spent 25 minutes of time with patient 50% or more of which was discussing labs and plans of care.

## 2018-09-25 ENCOUNTER — TELEPHONE (OUTPATIENT)
Dept: INTERNAL MEDICINE | Facility: CLINIC | Age: 83
End: 2018-09-25

## 2018-09-25 DIAGNOSIS — N34.2 INFECTIVE URETHRITIS: Primary | ICD-10-CM

## 2018-09-25 RX ORDER — CIPROFLOXACIN 250 MG/1
250 TABLET, FILM COATED ORAL 2 TIMES DAILY
Qty: 6 TABLET | Refills: 0 | Status: SHIPPED | OUTPATIENT
Start: 2018-09-25 | End: 2018-09-28

## 2018-09-25 NOTE — TELEPHONE ENCOUNTER
----- Message from Kristyn Ding sent at 9/25/2018  2:50 PM CDT -----  Contact: pt   Pt was returning nurse call    [phone] (home)

## 2018-09-27 LAB — BACTERIA UR CULT: NORMAL

## 2018-12-12 ENCOUNTER — PATIENT OUTREACH (OUTPATIENT)
Dept: ADMINISTRATIVE | Facility: HOSPITAL | Age: 83
End: 2018-12-12

## 2018-12-13 ENCOUNTER — OFFICE VISIT (OUTPATIENT)
Dept: INTERNAL MEDICINE | Facility: CLINIC | Age: 83
End: 2018-12-13
Payer: MEDICARE

## 2018-12-13 ENCOUNTER — CLINICAL SUPPORT (OUTPATIENT)
Dept: INTERNAL MEDICINE | Facility: CLINIC | Age: 83
End: 2018-12-13
Payer: MEDICARE

## 2018-12-13 ENCOUNTER — LAB VISIT (OUTPATIENT)
Dept: LAB | Facility: HOSPITAL | Age: 83
End: 2018-12-13
Attending: INTERNAL MEDICINE
Payer: MEDICARE

## 2018-12-13 VITALS
TEMPERATURE: 98 F | OXYGEN SATURATION: 98 % | WEIGHT: 169.88 LBS | HEIGHT: 66 IN | RESPIRATION RATE: 16 BRPM | DIASTOLIC BLOOD PRESSURE: 52 MMHG | SYSTOLIC BLOOD PRESSURE: 114 MMHG | BODY MASS INDEX: 27.3 KG/M2 | HEART RATE: 72 BPM

## 2018-12-13 DIAGNOSIS — R42 LIGHTHEADEDNESS: ICD-10-CM

## 2018-12-13 DIAGNOSIS — R79.9 ELEVATED BUN: ICD-10-CM

## 2018-12-13 DIAGNOSIS — E55.9 VITAMIN D DEFICIENCY: ICD-10-CM

## 2018-12-13 DIAGNOSIS — Z01.810 PREOP CARDIOVASCULAR EXAM: ICD-10-CM

## 2018-12-13 DIAGNOSIS — Z78.0 ASYMPTOMATIC AGE-RELATED POSTMENOPAUSAL STATE: ICD-10-CM

## 2018-12-13 DIAGNOSIS — I50.32 CHRONIC DIASTOLIC CONGESTIVE HEART FAILURE: Primary | ICD-10-CM

## 2018-12-13 LAB
25(OH)D3+25(OH)D2 SERPL-MCNC: 40 NG/ML
ANION GAP SERPL CALC-SCNC: 10 MMOL/L
BASOPHILS # BLD AUTO: 0.02 K/UL
BASOPHILS NFR BLD: 0.3 %
BUN SERPL-MCNC: 31 MG/DL
CALCIUM SERPL-MCNC: 9.5 MG/DL
CHLORIDE SERPL-SCNC: 106 MMOL/L
CO2 SERPL-SCNC: 26 MMOL/L
CREAT SERPL-MCNC: 1.7 MG/DL
DIFFERENTIAL METHOD: ABNORMAL
EOSINOPHIL # BLD AUTO: 0 K/UL
EOSINOPHIL NFR BLD: 0.5 %
ERYTHROCYTE [DISTWIDTH] IN BLOOD BY AUTOMATED COUNT: 13.4 %
EST. GFR  (AFRICAN AMERICAN): 31.5 ML/MIN/1.73 M^2
EST. GFR  (NON AFRICAN AMERICAN): 27.3 ML/MIN/1.73 M^2
GLUCOSE SERPL-MCNC: 92 MG/DL
HCT VFR BLD AUTO: 42.1 %
HGB BLD-MCNC: 13.5 G/DL
IMM GRANULOCYTES # BLD AUTO: 0.02 K/UL
IMM GRANULOCYTES NFR BLD AUTO: 0.3 %
LYMPHOCYTES # BLD AUTO: 1.8 K/UL
LYMPHOCYTES NFR BLD: 29.5 %
MCH RBC QN AUTO: 31.9 PG
MCHC RBC AUTO-ENTMCNC: 32.1 G/DL
MCV RBC AUTO: 100 FL
MONOCYTES # BLD AUTO: 0.8 K/UL
MONOCYTES NFR BLD: 12.5 %
NEUTROPHILS # BLD AUTO: 3.5 K/UL
NEUTROPHILS NFR BLD: 56.9 %
NRBC BLD-RTO: 0 /100 WBC
PLATELET # BLD AUTO: 201 K/UL
PMV BLD AUTO: 10.6 FL
POTASSIUM SERPL-SCNC: 4.6 MMOL/L
RBC # BLD AUTO: 4.23 M/UL
SODIUM SERPL-SCNC: 142 MMOL/L
WBC # BLD AUTO: 6.16 K/UL

## 2018-12-13 PROCEDURE — 82306 VITAMIN D 25 HYDROXY: CPT

## 2018-12-13 PROCEDURE — 85025 COMPLETE CBC W/AUTO DIFF WBC: CPT

## 2018-12-13 PROCEDURE — 99214 OFFICE O/P EST MOD 30 MIN: CPT | Mod: PBBFAC,PN | Performed by: INTERNAL MEDICINE

## 2018-12-13 PROCEDURE — 99999 PR PBB SHADOW E&M-EST. PATIENT-LVL IV: CPT | Mod: PBBFAC,,, | Performed by: INTERNAL MEDICINE

## 2018-12-13 PROCEDURE — 99215 OFFICE O/P EST HI 40 MIN: CPT | Mod: S$PBB,,, | Performed by: INTERNAL MEDICINE

## 2018-12-13 PROCEDURE — 93005 ELECTROCARDIOGRAM TRACING: CPT | Mod: PBBFAC,PN | Performed by: INTERNAL MEDICINE

## 2018-12-13 PROCEDURE — 36415 COLL VENOUS BLD VENIPUNCTURE: CPT | Mod: PO

## 2018-12-13 PROCEDURE — 80048 BASIC METABOLIC PNL TOTAL CA: CPT

## 2018-12-13 PROCEDURE — 93010 ELECTROCARDIOGRAM REPORT: CPT | Mod: ,,, | Performed by: INTERNAL MEDICINE

## 2018-12-13 RX ORDER — ISOSORBIDE DINITRATE AND HYDRALAZINE HYDROCHLORIDE 37.5; 2 MG/1; MG/1
1 TABLET ORAL 2 TIMES DAILY
Qty: 60 TABLET | Refills: 11 | Status: SHIPPED | OUTPATIENT
Start: 2018-12-13 | End: 2019-03-01 | Stop reason: SINTOL

## 2018-12-13 RX ORDER — DOXYCYCLINE 100 MG/1
CAPSULE ORAL
Refills: 0 | COMMUNITY
Start: 2018-11-28 | End: 2019-03-01

## 2018-12-13 RX ORDER — BESIFLOXACIN 6 MG/ML
SUSPENSION OPHTHALMIC
Refills: 1 | COMMUNITY
Start: 2018-11-19

## 2018-12-13 RX ORDER — DICYCLOMINE HYDROCHLORIDE 20 MG/1
TABLET ORAL
Refills: 0 | COMMUNITY
Start: 2018-10-12

## 2018-12-13 RX ORDER — CEFADROXIL 500 MG/1
CAPSULE ORAL
Refills: 0 | COMMUNITY
Start: 2018-11-19 | End: 2019-11-06

## 2018-12-13 NOTE — PROGRESS NOTES
Subjective:      Patient ID: Amy Ding is a 84 y.o. female.    Chief Complaint: Follow-up      HPI     Ms. Amy Ding is a patient of Chicho Black MD, who presents for FU on constipation, nausea, rectal pain, all of which has improved.    She reports having occasional dizziness and light-headed, but no recent falls. No cp. No sob. She endorses occasional heart palpitations.    She reports having tender and burning varicose veins, for which she has seen a vascular surgeon for, which she has a f/u appointment later this month.     Labs reviewed and discussed with patient, which revealed elevated BUN, but improved sCr (slightly; CKD3) & vitamin D in low-normal range. She reports taking vitamin D3 3000 IU po qd.    She declines flu shot today, but is amenable to DEXA.      Past Medical History:   Diagnosis Date    Atrial fibrillation     PAROXYSMAL    Bilateral renal masses     s/p R partial (lower pole) nephrectomy (2/2 malignant neoplasm); L kidney intact; Now on Trospium CL 20 mg BID; followed by Kody Cardona MD Urology    CKD (chronic kidney disease) stage 3, GFR 30-59 ml/min     Diastolic heart failure     Grade 1 diastolic dysfunction; EF 60% per ECHO 4/28/17    High cholesterol     HTN (hypertension)     Stroke     TIA - 2015    TIA (transient ischemic attack) 2014    [facility], where she was started on Plavix     Past Surgical History:   Procedure Laterality Date    HERNIA REPAIR      right partial nephrectomy Right 2015     Social History     Socioeconomic History    Marital status:      Spouse name: Not on file    Number of children: Not on file    Years of education: Not on file    Highest education level: Not on file   Social Needs    Financial resource strain: Not on file    Food insecurity - worry: Not on file    Food insecurity - inability: Not on file    Transportation needs - medical: Not on file    Transportation needs - non-medical: Not on file   Occupational History     Occupation: disabled   Tobacco Use    Smoking status: Never Smoker    Smokeless tobacco: Never Used   Substance and Sexual Activity    Alcohol use: No    Drug use: No    Sexual activity: Not on file   Other Topics Concern    Not on file   Social History Narrative    Breakfast: Cereal or oatmeal or grits; Coffee with milk and Splenda    Lunch: Baked chicken, veggie, sometimes rice; water or fruit juice    Dinner: Shrimp soup    Snacks: Rare    Eats out: 3x/wk    Water: 2 bottles (16 oz) per day     Family History   Problem Relation Age of Onset    Cancer Mother     Thyroid disease Mother     Cancer Sister     Cancer Sister        Current Outpatient Medications:     amiodarone (PACERONE) 200 MG Tab, Take 100 mg by mouth once daily., Disp: , Rfl:     BESIVANCE 0.6 % DrpS, INSTILL 1 DROP INTO THE LEFT EYE TID, Disp: , Rfl: 1    bumetanide (BUMEX) 1 MG tablet, TK 1 T PO BID, Disp: , Rfl: 0    cefadroxil (DURICEF) 500 MG Cap, TK 1 C PO BID, Disp: , Rfl: 0    clopidogrel (PLAVIX) 75 mg tablet, TK 1 T PO QD, Disp: , Rfl: 1    dicyclomine (BENTYL) 20 mg tablet, TK 1 T PO QID PRN CRAMPING, Disp: , Rfl: 0    docusate sodium (COLACE) 100 MG capsule, Take 1 capsule (100 mg total) by mouth 3 (three) times daily as needed for Constipation., Disp: 30 capsule, Rfl: 3    doxycycline (MONODOX) 100 MG capsule, TK 1 C PO BID, Disp: , Rfl: 0    potassium chloride SA (K-DUR,KLOR-CON) 20 MEQ tablet, TK 1 T PO QD, Disp: 90 tablet, Rfl: 3    trospium (SANCTURA) 20 mg Tab tablet, Take 20 mg by mouth 2 (two) times daily., Disp: , Rfl: 3    atorvastatin (LIPITOR) 20 MG tablet, Take 1 tablet (20 mg total) by mouth once daily., Disp: 90 tablet, Rfl: 3    isosorbide-hydrALAZINE 20-37.5 mg (BIDIL) 20-37.5 mg Tab, Take 1 tablet by mouth 2 (two) times daily., Disp: 60 tablet, Rfl: 11    loratadine (CLARITIN) 10 mg tablet, Take 1 tablet (10 mg total) by mouth once daily., Disp: , Rfl: 0    Review of patient's allergies  "indicates:   Allergen Reactions    Sulfa (sulfonamide antibiotics) Anaphylaxis        Review of Systems   Constitutional: Negative for activity change and unexpected weight change.   HENT: Negative for hearing loss, rhinorrhea and trouble swallowing.    Eyes: Negative for discharge and visual disturbance.   Respiratory: Negative for chest tightness and wheezing.    Cardiovascular: Positive for palpitations (Occur 3x/wk, each lasting 15-20 min, no assoc w/ cp or sob or lightheadedness). Negative for chest pain.   Gastrointestinal: Positive for constipation (Improved with MOM; last BM was today). Negative for blood in stool, diarrhea and vomiting.   Endocrine: Negative for polydipsia and polyuria.   Genitourinary: Negative for difficulty urinating, dysuria, hematuria and menstrual problem.   Musculoskeletal: Negative for arthralgias, joint swelling and neck pain.   Neurological: Negative for weakness and headaches.   Psychiatric/Behavioral: Negative for confusion and dysphoric mood.        Objective:     BP (!) 114/52 (BP Location: Left arm, Patient Position: Sitting, BP Method: Large (Manual))   Pulse 72   Temp 98.1 °F (36.7 °C) (Tympanic)   Resp 16   Ht 5' 6" (1.676 m)   Wt 77 kg (169 lb 13.8 oz)   SpO2 98%   BMI 27.42 kg/m²     Physical Exam  GEN: A&O fully, NAD  PSYC: Normal affect  EXT: Varicose veins      Lab Results   Component Value Date    WBC 5.48 09/24/2018    HGB 13.0 09/24/2018    HCT 41.1 09/24/2018     09/24/2018    CHOL 183 09/10/2018    TRIG 86 09/10/2018    HDL 55 09/10/2018    LDLCALC 110.8 09/10/2018    ALT 26 09/24/2018    AST 25 09/24/2018     09/24/2018    K 4.2 09/24/2018     09/24/2018    CREATININE 1.3 09/24/2018    BUN 34 (H) 09/24/2018    CO2 26 09/24/2018    CALCIUM 9.4 09/24/2018       Assessment:      1. Chronic diastolic congestive heart failure: Stable. Will decrease BIDIL given normal BNP and lightheadedness by 0.5 tab/d.    2. Asymptomatic age-related " postmenopausal state: Will check DEXA.   3. Elevated BUN: Will f/u bmp.   4. Vitamin D deficiency: Will check since she is supplementing 3000 IU vitD3 po qd.   5. Lightheadedness: No falls. Likely 2/2 BP rx. Risks and benefits discussed and patient chose to move forward with decrease her BIDIL from 1 BID & 0.5 tab QHS to 1 po BID.     6.      Constipation: Improved w/ rx. Encouraged to increase H2O to 70-80 oz/day and increase yogurt,            whole fruit and cooked vegetables.   7.      Preop: Moderate risk patient for moderate risk surgery (general anesthesia). Will check EKG & labs.    Plan:   Chronic diastolic congestive heart failure  -     isosorbide-hydrALAZINE 20-37.5 mg (BIDIL) 20-37.5 mg Tab; Take 1 tablet by mouth 2 (two) times daily.  Dispense: 60 tablet; Refill: 11    Asymptomatic age-related postmenopausal state  -     DXA Bone Density Spine And Hip; Future; Expected date: 12/13/2018    Elevated BUN  -     Basic metabolic panel; Future; Expected date: 12/13/2018    Vitamin D deficiency  -     Vitamin D; Future; Expected date: 12/13/2018    Lightheadedness    Preop cardiovascular exam  -     CBC auto differential; Future; Expected date: 12/13/2018  -     IN OFFICE EKG 12-LEAD (to Muse); Future; Expected date: 12/13/2018        Follow-up in about 2 months (around 2/13/2019), or if symptoms worsen or fail to improve, for FU on dizziness.    I spent 45 minutes of time with patient 50% or more of which was discussing labs and plans of care.

## 2018-12-17 DIAGNOSIS — I50.9 CHRONIC CONGESTIVE HEART FAILURE: ICD-10-CM

## 2018-12-17 RX ORDER — POTASSIUM CHLORIDE 20 MEQ/1
TABLET, EXTENDED RELEASE ORAL
Qty: 90 TABLET | Refills: 0 | Status: SHIPPED | OUTPATIENT
Start: 2018-12-17 | End: 2019-03-24 | Stop reason: SDUPTHER

## 2019-01-02 ENCOUNTER — TELEPHONE (OUTPATIENT)
Dept: INTERNAL MEDICINE | Facility: CLINIC | Age: 84
End: 2019-01-02

## 2019-01-30 ENCOUNTER — PATIENT OUTREACH (OUTPATIENT)
Dept: ADMINISTRATIVE | Facility: HOSPITAL | Age: 84
End: 2019-01-30

## 2019-03-01 ENCOUNTER — OFFICE VISIT (OUTPATIENT)
Dept: INTERNAL MEDICINE | Facility: CLINIC | Age: 84
End: 2019-03-01
Payer: MEDICARE

## 2019-03-01 VITALS
BODY MASS INDEX: 28.98 KG/M2 | HEIGHT: 64 IN | TEMPERATURE: 97 F | WEIGHT: 169.75 LBS | SYSTOLIC BLOOD PRESSURE: 99 MMHG | DIASTOLIC BLOOD PRESSURE: 52 MMHG | HEART RATE: 62 BPM

## 2019-03-01 DIAGNOSIS — I50.32 CHRONIC DIASTOLIC HEART FAILURE: ICD-10-CM

## 2019-03-01 DIAGNOSIS — N18.30 CKD (CHRONIC KIDNEY DISEASE) STAGE 3, GFR 30-59 ML/MIN: Primary | ICD-10-CM

## 2019-03-01 DIAGNOSIS — I95.2 HYPOTENSION DUE TO DRUGS: ICD-10-CM

## 2019-03-01 DIAGNOSIS — I50.32 CHRONIC DIASTOLIC CONGESTIVE HEART FAILURE: ICD-10-CM

## 2019-03-01 DIAGNOSIS — Z78.0 POSTMENOPAUSAL STATE: ICD-10-CM

## 2019-03-01 PROCEDURE — 99999 PR PBB SHADOW E&M-EST. PATIENT-LVL III: CPT | Mod: PBBFAC,,, | Performed by: INTERNAL MEDICINE

## 2019-03-01 PROCEDURE — 99214 PR OFFICE/OUTPT VISIT, EST, LEVL IV, 30-39 MIN: ICD-10-PCS | Mod: S$PBB,,, | Performed by: INTERNAL MEDICINE

## 2019-03-01 PROCEDURE — 99999 PR PBB SHADOW E&M-EST. PATIENT-LVL III: ICD-10-PCS | Mod: PBBFAC,,, | Performed by: INTERNAL MEDICINE

## 2019-03-01 PROCEDURE — 99213 OFFICE O/P EST LOW 20 MIN: CPT | Mod: PBBFAC,PN | Performed by: INTERNAL MEDICINE

## 2019-03-01 PROCEDURE — 99214 OFFICE O/P EST MOD 30 MIN: CPT | Mod: S$PBB,,, | Performed by: INTERNAL MEDICINE

## 2019-03-01 RX ORDER — ISOSORBIDE DINITRATE 10 MG/1
10 TABLET ORAL 2 TIMES DAILY
Qty: 60 TABLET | Refills: 11 | Status: SHIPPED | OUTPATIENT
Start: 2019-03-01 | End: 2020-03-17

## 2019-03-01 RX ORDER — PREDNISOLONE ACETATE 10 MG/ML
SUSPENSION/ DROPS OPHTHALMIC
Refills: 0 | COMMUNITY
Start: 2019-01-29

## 2019-03-01 RX ORDER — HYDRALAZINE HYDROCHLORIDE 10 MG/1
10 TABLET, FILM COATED ORAL EVERY 12 HOURS
Qty: 60 TABLET | Refills: 11 | Status: SHIPPED | OUTPATIENT
Start: 2019-03-01 | End: 2020-03-17

## 2019-03-01 RX ORDER — ATORVASTATIN CALCIUM 20 MG/1
20 TABLET, FILM COATED ORAL DAILY
Qty: 90 TABLET | Refills: 3 | Status: SHIPPED | OUTPATIENT
Start: 2019-03-01 | End: 2020-02-29

## 2019-03-01 NOTE — PROGRESS NOTES
Subjective:      Patient ID: Amy Ding is a 85 y.o. female.    Chief Complaint: Follow-up      HPI     Ms. Amy Ding is a patient of Chicho Black MD, who presents for Follow-up  on dizziness.    She reports feeling dizzy 1x/week, but denies any recent falls. She also reports having more intermittent headaches than usual. She also reports feeling more sleepy than usual.      Past Medical History:   Diagnosis Date    Atrial fibrillation     PAROXYSMAL    CKD (chronic kidney disease) stage 3, GFR 30-59 ml/min     Diastolic heart failure     Grade 1 diastolic dysfunction; EF 60% per ECHO 4/28/17    High cholesterol     History of kidney cancer     s/p R partial (lower pole) nephrectomy (2/2 malignant neoplasm); L kidney intact; Now on Trospium CL 20 mg BID; followed by Kody Cardona MD Urology    HTN (hypertension)     Left renal mass     Malignant, except renal pelvis, small, stable, on surveillance protocol per Dr. Clark's note dated 2/7/19 based on CT abn dated 1/8/19; s/p R partial nephrectomy due to primary malignant neoplasm of bilateral kidneys    Stroke     TIA - 2015    TIA (transient ischemic attack) 2014    [facility], where she was started on Plavix     Past Surgical History:   Procedure Laterality Date    HERNIA REPAIR      right partial nephrectomy Right 2015     Social History     Socioeconomic History    Marital status:      Spouse name: Not on file    Number of children: Not on file    Years of education: Not on file    Highest education level: Not on file   Social Needs    Financial resource strain: Not on file    Food insecurity - worry: Not on file    Food insecurity - inability: Not on file    Transportation needs - medical: Not on file    Transportation needs - non-medical: Not on file   Occupational History    Occupation: disabled   Tobacco Use    Smoking status: Never Smoker    Smokeless tobacco: Never Used   Substance and Sexual Activity    Alcohol use: No     Drug use: No    Sexual activity: Not on file   Other Topics Concern    Not on file   Social History Narrative    Breakfast: Cereal or oatmeal or grits; Coffee with milk and Splenda    Lunch: Baked chicken, veggie, sometimes rice; water or fruit juice    Dinner: Shrimp soup    Snacks: Rare    Eats out: 3x/wk    Water: 1-2 bottles (16 oz) per day     Family History   Problem Relation Age of Onset    Cancer Mother     Thyroid disease Mother     Cancer Sister     Cancer Sister        Current Outpatient Medications:     amiodarone (PACERONE) 200 MG Tab, Take 100 mg by mouth once daily., Disp: , Rfl:     BESIVANCE 0.6 % DrpS, INSTILL 1 DROP INTO THE LEFT EYE TID, Disp: , Rfl: 1    bumetanide (BUMEX) 1 MG tablet, TK 1 T PO BID, Disp: , Rfl: 0    cefadroxil (DURICEF) 500 MG Cap, TK 1 C PO BID, Disp: , Rfl: 0    clopidogrel (PLAVIX) 75 mg tablet, TK 1 T PO QD, Disp: , Rfl: 1    docusate sodium (COLACE) 100 MG capsule, Take 1 capsule (100 mg total) by mouth 3 (three) times daily as needed for Constipation., Disp: 30 capsule, Rfl: 3    loratadine (CLARITIN) 10 mg tablet, Take 1 tablet (10 mg total) by mouth once daily., Disp: , Rfl: 0    potassium chloride SA (K-DUR,KLOR-CON) 20 MEQ tablet, TAKE 1 TABLET BY MOUTH EVERY DAY, Disp: 90 tablet, Rfl: 0    trospium (SANCTURA) 20 mg Tab tablet, Take 20 mg by mouth 2 (two) times daily., Disp: , Rfl: 3    atorvastatin (LIPITOR) 20 MG tablet, Take 1 tablet (20 mg total) by mouth once daily., Disp: 90 tablet, Rfl: 3    dicyclomine (BENTYL) 20 mg tablet, TK 1 T PO QID PRN CRAMPING, Disp: , Rfl: 0    hydrALAZINE (APRESOLINE) 10 MG tablet, Take 1 tablet (10 mg total) by mouth every 12 (twelve) hours., Disp: 60 tablet, Rfl: 11    isosorbide dinitrate (ISORDIL) 10 MG tablet, Take 1 tablet (10 mg total) by mouth 2 (two) times daily., Disp: 60 tablet, Rfl: 11    prednisoLONE acetate (PRED FORTE) 1 % DrpS, , Disp: , Rfl: 0    Review of patient's allergies indicates:  "  Allergen Reactions    Sulfa (sulfonamide antibiotics) Anaphylaxis        Review of Systems   All remaining systems negative    Objective:     BP (!) 99/52 (BP Location: Left arm, Patient Position: Sitting, BP Method: Medium (Manual))   Pulse 62   Temp 96.5 °F (35.8 °C) (Tympanic)   Ht 5' 4" (1.626 m)   Wt 77 kg (169 lb 12.1 oz)   BMI 29.14 kg/m²     Physical Exam  GEN: A&O fully, NAD  PSYC: Normal affect      Lab Results   Component Value Date    WBC 6.16 12/13/2018    HGB 13.5 12/13/2018    HCT 42.1 12/13/2018     12/13/2018    CHOL 183 09/10/2018    TRIG 86 09/10/2018    HDL 55 09/10/2018    LDLCALC 110.8 09/10/2018    ALT 26 09/24/2018    AST 25 09/24/2018     12/13/2018    K 4.6 12/13/2018     12/13/2018    CREATININE 1.7 (H) 12/13/2018    BUN 31 (H) 12/13/2018    CO2 26 12/13/2018    CALCIUM 9.5 12/13/2018       Assessment:      1. Hypotension, symptomatic: Risks and benefits discussed and patient chose to move forward with changing her BIDIL to the component meds to decrease doses: from isosorbide dinitrate 20 mg & hydralazine 37.5 mg BID to isosorbide dinitrate 10 mg BID and hydralazine 10 mg BID.     2.      Chronic diastolic congestive heart failure: Stable. As above. Will renew atorvastatin 20 qd.  3.      HA/sleepiness/dizziness: All likely 2/2 hypotension, which we will f/u in 2 weeks.  4.      HM: Declined flu shot, but is open to scheduling her DEXA.    Plan:   CKD (chronic kidney disease) stage 3, GFR 30-59 ml/min    Chronic diastolic congestive heart failure  -     isosorbide dinitrate (ISORDIL) 10 MG tablet; Take 1 tablet (10 mg total) by mouth 2 (two) times daily.  Dispense: 60 tablet; Refill: 11  -     hydrALAZINE (APRESOLINE) 10 MG tablet; Take 1 tablet (10 mg total) by mouth every 12 (twelve) hours.  Dispense: 60 tablet; Refill: 11    Hypotension due to drugs    Chronic diastolic heart failure  -     atorvastatin (LIPITOR) 20 MG tablet; Take 1 tablet (20 mg total) by " mouth once daily.  Dispense: 90 tablet; Refill: 3    Postmenopausal state        Follow-up in about 2 weeks (around 3/15/2019), or if symptoms worsen or fail to improve, for FU on hypotension, dizziness, HA.    I spent 25 minutes of time with patient 50% or more of which was discussing labs and plans of care.

## 2019-03-15 ENCOUNTER — OFFICE VISIT (OUTPATIENT)
Dept: INTERNAL MEDICINE | Facility: CLINIC | Age: 84
End: 2019-03-15
Payer: MEDICARE

## 2019-03-15 VITALS
SYSTOLIC BLOOD PRESSURE: 134 MMHG | WEIGHT: 159.94 LBS | HEART RATE: 63 BPM | HEIGHT: 64 IN | OXYGEN SATURATION: 98 % | TEMPERATURE: 97 F | DIASTOLIC BLOOD PRESSURE: 66 MMHG | BODY MASS INDEX: 27.31 KG/M2 | RESPIRATION RATE: 16 BRPM

## 2019-03-15 DIAGNOSIS — N18.30 CKD (CHRONIC KIDNEY DISEASE) STAGE 3, GFR 30-59 ML/MIN: ICD-10-CM

## 2019-03-15 DIAGNOSIS — I10 ESSENTIAL HYPERTENSION: Primary | ICD-10-CM

## 2019-03-15 PROCEDURE — 99999 PR PBB SHADOW E&M-EST. PATIENT-LVL III: ICD-10-PCS | Mod: PBBFAC,,, | Performed by: INTERNAL MEDICINE

## 2019-03-15 PROCEDURE — 99214 OFFICE O/P EST MOD 30 MIN: CPT | Mod: S$PBB,,, | Performed by: INTERNAL MEDICINE

## 2019-03-15 PROCEDURE — 99999 PR PBB SHADOW E&M-EST. PATIENT-LVL III: CPT | Mod: PBBFAC,,, | Performed by: INTERNAL MEDICINE

## 2019-03-15 PROCEDURE — 99213 OFFICE O/P EST LOW 20 MIN: CPT | Mod: PBBFAC,PN | Performed by: INTERNAL MEDICINE

## 2019-03-15 PROCEDURE — 99214 PR OFFICE/OUTPT VISIT, EST, LEVL IV, 30-39 MIN: ICD-10-PCS | Mod: S$PBB,,, | Performed by: INTERNAL MEDICINE

## 2019-03-15 NOTE — PROGRESS NOTES
Subjective:      Patient ID: Amy Ding is a 85 y.o. female.    Chief Complaint: Follow-up      HPI     Ms. Amy Ding is a patient of Chicho Black MD, who presents for Follow-up  on hypotension, dizziness, HA.    She reports her bp increased after leaving our office 2 weeks ago and saw Dr. Dylon Persaud who advised against decreasing her Bidil dose as we suggested based on her initially low BP in the setting of dizziness, HAs and sleepiness. She reports her dizziness has not recurred and has less frequent headaches, which she feels is related to certain dietary choices, which she is now avoiding i.e. light grapes.    VS, labs & imaging reviewed and discussed with patient, including vitamin D 40 ng/mL.    Of note, EPIC indicates due preventive measures, including DEXA, which she plans to schedule for the summer.     She reports having intermittent LUQ pain 2 days ago, which improved after taking Tylenol 500 mg 1 po once.      Past Medical History:   Diagnosis Date    Atrial fibrillation     PAROXYSMAL    CKD (chronic kidney disease) stage 3, GFR 30-59 ml/min     Diastolic heart failure     Grade 1 diastolic dysfunction; EF 60% per ECHO 4/28/17    High cholesterol     History of kidney cancer     s/p R partial (lower pole) nephrectomy (2/2 malignant neoplasm); L kidney intact; Now on Trospium CL 20 mg BID; followed by Kody Cardona MD Urology    HTN (hypertension)     Left renal mass     Malignant, except renal pelvis, small, stable, on surveillance protocol per Dr. Clark's note dated 2/7/19 based on CT abn dated 1/8/19; s/p R partial nephrectomy due to primary malignant neoplasm of bilateral kidneys    Stroke     TIA - 2015    TIA (transient ischemic attack) 2014    [facility], where she was started on Plavix     Past Surgical History:   Procedure Laterality Date    HERNIA REPAIR      right partial nephrectomy Right 2015     Social History     Socioeconomic History    Marital status:      Spouse  name: Not on file    Number of children: Not on file    Years of education: Not on file    Highest education level: Not on file   Social Needs    Financial resource strain: Not on file    Food insecurity - worry: Not on file    Food insecurity - inability: Not on file    Transportation needs - medical: Not on file    Transportation needs - non-medical: Not on file   Occupational History    Occupation: disabled   Tobacco Use    Smoking status: Never Smoker    Smokeless tobacco: Never Used   Substance and Sexual Activity    Alcohol use: No    Drug use: No    Sexual activity: Not on file   Other Topics Concern    Not on file   Social History Narrative    Breakfast: Cereal or oatmeal or grits; Coffee with milk and Splenda    Lunch: Baked chicken, veggie, sometimes rice; water or fruit juice    Dinner: Shrimp soup    Snacks: Rare    Eats out: 3x/wk    Water: 1-2 bottles (16 oz) per day     Family History   Problem Relation Age of Onset    Cancer Mother     Thyroid disease Mother     Cancer Sister     Cancer Sister        Current Outpatient Medications:     amiodarone (PACERONE) 200 MG Tab, Take 100 mg by mouth once daily., Disp: , Rfl:     atorvastatin (LIPITOR) 20 MG tablet, Take 1 tablet (20 mg total) by mouth once daily., Disp: 90 tablet, Rfl: 3    BESIVANCE 0.6 % DrpS, INSTILL 1 DROP INTO THE LEFT EYE TID, Disp: , Rfl: 1    bumetanide (BUMEX) 1 MG tablet, TK 1 T PO BID, Disp: , Rfl: 0    clopidogrel (PLAVIX) 75 mg tablet, TK 1 T PO QD, Disp: , Rfl: 1    dicyclomine (BENTYL) 20 mg tablet, TK 1 T PO QID PRN CRAMPING, Disp: , Rfl: 0    docusate sodium (COLACE) 100 MG capsule, Take 1 capsule (100 mg total) by mouth 3 (three) times daily as needed for Constipation., Disp: 30 capsule, Rfl: 3    hydrALAZINE (APRESOLINE) 10 MG tablet, Take 1 tablet (10 mg total) by mouth every 12 (twelve) hours., Disp: 60 tablet, Rfl: 11    isosorbide dinitrate (ISORDIL) 10 MG tablet, Take 1 tablet (10 mg total)  "by mouth 2 (two) times daily., Disp: 60 tablet, Rfl: 11    potassium chloride SA (K-DUR,KLOR-CON) 20 MEQ tablet, TAKE 1 TABLET BY MOUTH EVERY DAY, Disp: 90 tablet, Rfl: 0    trospium (SANCTURA) 20 mg Tab tablet, Take 20 mg by mouth 2 (two) times daily., Disp: , Rfl: 3    cefadroxil (DURICEF) 500 MG Cap, TK 1 C PO BID, Disp: , Rfl: 0    loratadine (CLARITIN) 10 mg tablet, Take 1 tablet (10 mg total) by mouth once daily., Disp: , Rfl: 0    prednisoLONE acetate (PRED FORTE) 1 % DrpS, , Disp: , Rfl: 0    Review of patient's allergies indicates:   Allergen Reactions    Sulfa (sulfonamide antibiotics) Anaphylaxis        Review of Systems   All remaining systems negative    Objective:     /66 (BP Location: Left arm, Patient Position: Sitting, BP Method: Medium (Manual))   Pulse 63   Temp 96.6 °F (35.9 °C) (Tympanic)   Resp 16   Ht 5' 4" (1.626 m)   Wt 72.6 kg (159 lb 15.1 oz)   SpO2 98%   BMI 27.45 kg/m²     Physical Exam  GEN: A&O fully, NAD  PSYC: Normal affect      Lab Results   Component Value Date    WBC 6.16 12/13/2018    HGB 13.5 12/13/2018    HCT 42.1 12/13/2018     12/13/2018    CHOL 183 09/10/2018    TRIG 86 09/10/2018    HDL 55 09/10/2018    LDLCALC 110.8 09/10/2018    ALT 26 09/24/2018    AST 25 09/24/2018     12/13/2018    K 4.6 12/13/2018     12/13/2018    CREATININE 1.7 (H) 12/13/2018    BUN 31 (H) 12/13/2018    CO2 26 12/13/2018    CALCIUM 9.5 12/13/2018       Assessment:      1. Essential hypertension: Stable. Continue current medical regimen.    2.      CKD (chronic kidney disease) stage 3, GFR 30-59 ml/min: Will check CMP today.      Plan:   Essential hypertension    CKD (chronic kidney disease) stage 3, GFR 30-59 ml/min      Follow-up in about 6 months (around 9/15/2019), or if symptoms worsen or fail to improve, for FU on CKD3.    I spent 25 minutes of time with patient 50% or more of which was discussing labs and plans of care.  "

## 2019-03-24 DIAGNOSIS — I50.9 CHRONIC CONGESTIVE HEART FAILURE: ICD-10-CM

## 2019-03-26 RX ORDER — POTASSIUM CHLORIDE 20 MEQ/1
TABLET, EXTENDED RELEASE ORAL
Qty: 90 TABLET | Refills: 0 | Status: SHIPPED | OUTPATIENT
Start: 2019-03-26 | End: 2019-07-07 | Stop reason: SDUPTHER

## 2019-05-03 ENCOUNTER — TELEPHONE (OUTPATIENT)
Dept: INTERNAL MEDICINE | Facility: CLINIC | Age: 84
End: 2019-05-03

## 2019-05-03 NOTE — TELEPHONE ENCOUNTER
Called # listed, no answer. LMOM requesting return call. Vitamin D lab work from 4 months ago was within normal limits.

## 2019-05-03 NOTE — TELEPHONE ENCOUNTER
----- Message from Michelle Sutton sent at 5/3/2019  3:41 PM CDT -----  Contact: Pt  Please give pt a call at .[phone] (home) regarding the name and dosage of her VITAMIN D medication.

## 2019-05-06 ENCOUNTER — TELEPHONE (OUTPATIENT)
Dept: INTERNAL MEDICINE | Facility: CLINIC | Age: 84
End: 2019-05-06

## 2019-05-06 NOTE — TELEPHONE ENCOUNTER
Spoke with the patient and she takes Vitamin D otc. She has thrown her bottle away and does not remember what mg she was taking. She would like to know what Vitamin D dose Dr. Black recommends for her to take. Please review and advise.

## 2019-05-06 NOTE — TELEPHONE ENCOUNTER
----- Message from Judi Null sent at 5/6/2019  9:51 AM CDT -----  Contact: [pt  Pt request call back to verify the mg for vitamin D ... [phone] (home) if no answer leave v/m

## 2019-07-07 DIAGNOSIS — I50.9 CHRONIC CONGESTIVE HEART FAILURE: ICD-10-CM

## 2019-07-07 RX ORDER — POTASSIUM CHLORIDE 20 MEQ/1
TABLET, EXTENDED RELEASE ORAL
Qty: 90 TABLET | Refills: 0 | Status: SHIPPED | OUTPATIENT
Start: 2019-07-07 | End: 2019-12-13 | Stop reason: SDUPTHER

## 2019-09-13 ENCOUNTER — PATIENT OUTREACH (OUTPATIENT)
Dept: ADMINISTRATIVE | Facility: HOSPITAL | Age: 84
End: 2019-09-13

## 2019-10-15 ENCOUNTER — OFFICE VISIT (OUTPATIENT)
Dept: INTERNAL MEDICINE | Facility: CLINIC | Age: 84
End: 2019-10-15
Payer: MEDICARE

## 2019-10-15 VITALS
OXYGEN SATURATION: 99 % | HEART RATE: 54 BPM | SYSTOLIC BLOOD PRESSURE: 118 MMHG | BODY MASS INDEX: 28.63 KG/M2 | DIASTOLIC BLOOD PRESSURE: 62 MMHG | HEIGHT: 64 IN | TEMPERATURE: 98 F | WEIGHT: 167.69 LBS

## 2019-10-15 DIAGNOSIS — E78.2 MIXED HYPERLIPIDEMIA: ICD-10-CM

## 2019-10-15 DIAGNOSIS — E55.9 VITAMIN D DEFICIENCY: ICD-10-CM

## 2019-10-15 DIAGNOSIS — R10.13 EPIGASTRIC PAIN: ICD-10-CM

## 2019-10-15 DIAGNOSIS — N18.30 CKD (CHRONIC KIDNEY DISEASE) STAGE 3, GFR 30-59 ML/MIN: Primary | ICD-10-CM

## 2019-10-15 PROCEDURE — 99214 PR OFFICE/OUTPT VISIT, EST, LEVL IV, 30-39 MIN: ICD-10-PCS | Mod: S$PBB,,, | Performed by: INTERNAL MEDICINE

## 2019-10-15 PROCEDURE — 99214 OFFICE O/P EST MOD 30 MIN: CPT | Mod: S$PBB,,, | Performed by: INTERNAL MEDICINE

## 2019-10-15 PROCEDURE — 99213 OFFICE O/P EST LOW 20 MIN: CPT | Mod: PBBFAC,PN | Performed by: INTERNAL MEDICINE

## 2019-10-15 PROCEDURE — 99999 PR PBB SHADOW E&M-EST. PATIENT-LVL III: ICD-10-PCS | Mod: PBBFAC,,, | Performed by: INTERNAL MEDICINE

## 2019-10-15 PROCEDURE — 99999 PR PBB SHADOW E&M-EST. PATIENT-LVL III: CPT | Mod: PBBFAC,,, | Performed by: INTERNAL MEDICINE

## 2019-10-15 RX ORDER — FAMOTIDINE 40 MG/1
40 TABLET, FILM COATED ORAL 2 TIMES DAILY PRN
Qty: 30 TABLET | Refills: 11 | Status: SHIPPED | OUTPATIENT
Start: 2019-10-15 | End: 2020-01-16 | Stop reason: SDUPTHER

## 2019-10-15 NOTE — PROGRESS NOTES
Subjective:      Patient ID: Amy Ding is a 85 y.o. female.    Chief Complaint: Follow-up      HPI     Ms. Amy Ding is a patient of Chicho Black MD, who presents for Follow-up    VS, labs & imaging reviewed and discussed with patient, including     Of note, EPIC indicates due preventive measures, including FLU, PCV13, TDAP, all of which she refused today. She is also due for her FLP.    She reports having intermittent epigastric soreness over the past several months - years. She is not aware of anything that makes it worse or better. She reports having an EGD several years ago, but doesn't remember what the dx was.       Past Medical History:   Diagnosis Date    Atrial fibrillation     PAROXYSMAL    CKD (chronic kidney disease) stage 3, GFR 30-59 ml/min     Diastolic heart failure     Grade 1 diastolic dysfunction; EF 60-65% per ECHO 4/28/17    High cholesterol     History of kidney cancer     s/p R partial (lower pole) nephrectomy (2/2 malignant neoplasm); L kidney intact; Now on Trospium CL 20 mg BID; followed by Kody Cardona MD Urology    HTN (hypertension)     Left renal mass     Malignant, except renal pelvis, small, stable, on surveillance protocol per Dr. Clark's note dated 2/7/19 based on CT abn dated 1/8/19; s/p R partial nephrectomy due to primary malignant neoplasm of bilateral kidneys    Stroke     TIA - 2015    TIA (transient ischemic attack) 2014    [facility], where she was started on Plavix     Past Surgical History:   Procedure Laterality Date    HERNIA REPAIR      right partial nephrectomy Right 2015     Social History     Socioeconomic History    Marital status:      Spouse name: Not on file    Number of children: Not on file    Years of education: Not on file    Highest education level: Not on file   Occupational History    Occupation: disabled   Social Needs    Financial resource strain: Not on file    Food insecurity:     Worry: Not on file     Inability: Not on  file    Transportation needs:     Medical: Not on file     Non-medical: Not on file   Tobacco Use    Smoking status: Never Smoker    Smokeless tobacco: Never Used   Substance and Sexual Activity    Alcohol use: No    Drug use: No    Sexual activity: Not on file   Lifestyle    Physical activity:     Days per week: Not on file     Minutes per session: Not on file    Stress: Not on file   Relationships    Social connections:     Talks on phone: Not on file     Gets together: Not on file     Attends Jewish service: Not on file     Active member of club or organization: Not on file     Attends meetings of clubs or organizations: Not on file     Relationship status: Not on file   Other Topics Concern    Not on file   Social History Narrative    Breakfast: Cereal or oatmeal or grits; Coffee with milk and Splenda    Lunch: Baked chicken, veggie, sometimes rice; water or fruit juice    Dinner: Shrimp soup    Snacks: Rare    Eats out: 3x/wk    Water: 3 (16 oz) bottles/day (previously 32 oz/d)      Family History   Problem Relation Age of Onset    Cancer Mother     Thyroid disease Mother     Cancer Sister     Cancer Sister        Current Outpatient Medications:     amiodarone (PACERONE) 200 MG Tab, Take 100 mg by mouth once daily., Disp: , Rfl:     atorvastatin (LIPITOR) 20 MG tablet, Take 1 tablet (20 mg total) by mouth once daily., Disp: 90 tablet, Rfl: 3    bumetanide (BUMEX) 1 MG tablet, TK 1 T PO BID, Disp: , Rfl: 0    cefadroxil (DURICEF) 500 MG Cap, TK 1 C PO BID, Disp: , Rfl: 0    clopidogrel (PLAVIX) 75 mg tablet, TK 1 T PO QD, Disp: , Rfl: 1    isosorbide dinitrate (ISORDIL) 10 MG tablet, Take 1 tablet (10 mg total) by mouth 2 (two) times daily., Disp: 60 tablet, Rfl: 11    potassium chloride SA (K-DUR,KLOR-CON) 20 MEQ tablet, TAKE 1 TABLET BY MOUTH EVERY DAY, Disp: 90 tablet, Rfl: 0    trospium (SANCTURA) 20 mg Tab tablet, Take 20 mg by mouth 2 (two) times daily., Disp: , Rfl: 3     "BESIVANCE 0.6 % DrpS, INSTILL 1 DROP INTO THE LEFT EYE TID, Disp: , Rfl: 1    dicyclomine (BENTYL) 20 mg tablet, TK 1 T PO QID PRN CRAMPING, Disp: , Rfl: 0    docusate sodium (COLACE) 100 MG capsule, Take 1 capsule (100 mg total) by mouth 3 (three) times daily as needed for Constipation. (Patient not taking: Reported on 10/15/2019), Disp: 30 capsule, Rfl: 3    famotidine (PEPCID) 40 MG tablet, Take 1 tablet (40 mg total) by mouth 2 (two) times daily as needed for Heartburn., Disp: 30 tablet, Rfl: 11    hydrALAZINE (APRESOLINE) 10 MG tablet, Take 1 tablet (10 mg total) by mouth every 12 (twelve) hours. (Patient not taking: Reported on 10/15/2019), Disp: 60 tablet, Rfl: 11    loratadine (CLARITIN) 10 mg tablet, Take 1 tablet (10 mg total) by mouth once daily., Disp: , Rfl: 0    prednisoLONE acetate (PRED FORTE) 1 % DrpS, , Disp: , Rfl: 0    Review of patient's allergies indicates:   Allergen Reactions    Sulfa (sulfonamide antibiotics) Anaphylaxis        Review of Systems   All remaining systems negative    Objective:     /62 (BP Location: Left arm, Patient Position: Sitting, BP Method: Large (Manual))   Pulse (!) 54   Temp 97.8 °F (36.6 °C) (Temporal)   Ht 5' 4" (1.626 m)   Wt 76.1 kg (167 lb 10.6 oz)   SpO2 99%   BMI 28.78 kg/m²     Physical Exam  GEN: A&O fully, NAD  PSYC: Normal affect      Lab Results   Component Value Date    WBC 6.16 12/13/2018    HGB 13.5 12/13/2018    HCT 42.1 12/13/2018     12/13/2018    CHOL 183 09/10/2018    TRIG 86 09/10/2018    HDL 55 09/10/2018    LDLCALC 110.8 09/10/2018    ALT 26 09/24/2018    AST 25 09/24/2018     12/13/2018    K 4.6 12/13/2018     12/13/2018    CREATININE 1.7 (H) 12/13/2018    BUN 31 (H) 12/13/2018    CO2 26 12/13/2018    CALCIUM 9.5 12/13/2018       Assessment:      1. CKD (chronic kidney disease) stage 3, GFR 30-59 ml/min: Worsening. Will check CMP today.    2. HM: Declined all vaccinations.   3. Mixed hyperlipidemia: Will " check FLP today.    4.      GERD: Recommended avoidin. Tomato sauces i.e. Spaghetti, pizza, lasagna, etc.            2. Large or late meals            3. Caffeine i.e. Soda, coffee, chocolate, tea, etc.            4. Spicy foods            5. Alcoholic beverages            6. Spicy herbs i.e. peppermint, spearmint, etc.    I also recommended increasing water intake to 64-96 oz/day and foods high in fiber i.e. whole apples, while supplementing famotidine 40 mg by mouth twice daily as needed.     Plan:   CKD (chronic kidney disease) stage 3, GFR 30-59 ml/min  -     CBC auto differential; Future; Expected date: 10/15/2019  -     Comprehensive metabolic panel; Future; Expected date: 10/15/2019  -     Lipid panel; Future; Expected date: 10/15/2019  -     Vitamin D; Future; Expected date: 10/15/2019    Vitamin D deficiency    Mixed hyperlipidemia  -     Lipid panel; Future; Expected date: 10/15/2019    Epigastric pain  -     H.Pylori Antibody IgG; Future; Expected date: 10/15/2019  -     famotidine (PEPCID) 40 MG tablet; Take 1 tablet (40 mg total) by mouth 2 (two) times daily as needed for Heartburn.  Dispense: 30 tablet; Refill: 11      Follow up in about 3 months (around 1/15/2020), or if symptoms worsen or fail to improve, for FU on CKD3, epigastric discomfort.    I spent >25 minutes of time with patient 50% or more of which was discussing labs and plans of care.

## 2019-11-01 ENCOUNTER — TELEPHONE (OUTPATIENT)
Dept: INTERNAL MEDICINE | Facility: CLINIC | Age: 84
End: 2019-11-01

## 2019-11-01 NOTE — TELEPHONE ENCOUNTER
----- Message from Chicho Black MD sent at 10/31/2019  5:46 PM CDT -----  Dear Lizabeth,    Please let our patient know I've received labs from [facility] dated 10/7/19 of U/A & Ucx ordered by Dr. Kody Cardona, revealing UTI resistant to 1st generation cephalosporins, which Dr. Cardona tx'd her with. Would you please call to find out if she still has UTI sx. If so, we can tx her with Macrobid.      Please let us know if you have any questions or concerns.     Sincerely,  Chicho Black MD

## 2019-11-04 ENCOUNTER — LAB VISIT (OUTPATIENT)
Dept: LAB | Facility: HOSPITAL | Age: 84
End: 2019-11-04
Attending: INTERNAL MEDICINE
Payer: MEDICARE

## 2019-11-04 DIAGNOSIS — E78.2 MIXED HYPERLIPIDEMIA: ICD-10-CM

## 2019-11-04 DIAGNOSIS — R10.13 EPIGASTRIC PAIN: ICD-10-CM

## 2019-11-04 DIAGNOSIS — N18.30 CKD (CHRONIC KIDNEY DISEASE) STAGE 3, GFR 30-59 ML/MIN: ICD-10-CM

## 2019-11-04 LAB
25(OH)D3+25(OH)D2 SERPL-MCNC: 35 NG/ML (ref 30–96)
ALBUMIN SERPL BCP-MCNC: 3.5 G/DL (ref 3.5–5.2)
ALP SERPL-CCNC: 108 U/L (ref 55–135)
ALT SERPL W/O P-5'-P-CCNC: 23 U/L (ref 10–44)
ANION GAP SERPL CALC-SCNC: 8 MMOL/L (ref 8–16)
AST SERPL-CCNC: 26 U/L (ref 10–40)
BASOPHILS # BLD AUTO: 0.01 K/UL (ref 0–0.2)
BASOPHILS NFR BLD: 0.2 % (ref 0–1.9)
BILIRUB SERPL-MCNC: 0.4 MG/DL (ref 0.1–1)
BUN SERPL-MCNC: 27 MG/DL (ref 8–23)
CALCIUM SERPL-MCNC: 9 MG/DL (ref 8.7–10.5)
CHLORIDE SERPL-SCNC: 110 MMOL/L (ref 95–110)
CHOLEST SERPL-MCNC: 188 MG/DL (ref 120–199)
CHOLEST/HDLC SERPL: 2.8 {RATIO} (ref 2–5)
CO2 SERPL-SCNC: 25 MMOL/L (ref 23–29)
CREAT SERPL-MCNC: 1.3 MG/DL (ref 0.5–1.4)
DIFFERENTIAL METHOD: ABNORMAL
EOSINOPHIL # BLD AUTO: 0 K/UL (ref 0–0.5)
EOSINOPHIL NFR BLD: 0.2 % (ref 0–8)
ERYTHROCYTE [DISTWIDTH] IN BLOOD BY AUTOMATED COUNT: 13.4 % (ref 11.5–14.5)
EST. GFR  (AFRICAN AMERICAN): 43.2 ML/MIN/1.73 M^2
EST. GFR  (NON AFRICAN AMERICAN): 37.5 ML/MIN/1.73 M^2
GLUCOSE SERPL-MCNC: 97 MG/DL (ref 70–110)
HCT VFR BLD AUTO: 42.7 % (ref 37–48.5)
HDLC SERPL-MCNC: 66 MG/DL (ref 40–75)
HDLC SERPL: 35.1 % (ref 20–50)
HGB BLD-MCNC: 13.2 G/DL (ref 12–16)
IMM GRANULOCYTES # BLD AUTO: 0.02 K/UL (ref 0–0.04)
IMM GRANULOCYTES NFR BLD AUTO: 0.4 % (ref 0–0.5)
LDLC SERPL CALC-MCNC: 108.4 MG/DL (ref 63–159)
LYMPHOCYTES # BLD AUTO: 1.2 K/UL (ref 1–4.8)
LYMPHOCYTES NFR BLD: 26.4 % (ref 18–48)
MCH RBC QN AUTO: 31.4 PG (ref 27–31)
MCHC RBC AUTO-ENTMCNC: 30.9 G/DL (ref 32–36)
MCV RBC AUTO: 101 FL (ref 82–98)
MONOCYTES # BLD AUTO: 0.6 K/UL (ref 0.3–1)
MONOCYTES NFR BLD: 12.8 % (ref 4–15)
NEUTROPHILS # BLD AUTO: 2.8 K/UL (ref 1.8–7.7)
NEUTROPHILS NFR BLD: 60 % (ref 38–73)
NONHDLC SERPL-MCNC: 122 MG/DL
NRBC BLD-RTO: 0 /100 WBC
PLATELET # BLD AUTO: 179 K/UL (ref 150–350)
PMV BLD AUTO: 11 FL (ref 9.2–12.9)
POTASSIUM SERPL-SCNC: 4.3 MMOL/L (ref 3.5–5.1)
PROT SERPL-MCNC: 7.4 G/DL (ref 6–8.4)
RBC # BLD AUTO: 4.21 M/UL (ref 4–5.4)
SODIUM SERPL-SCNC: 143 MMOL/L (ref 136–145)
TRIGL SERPL-MCNC: 68 MG/DL (ref 30–150)
WBC # BLD AUTO: 4.7 K/UL (ref 3.9–12.7)

## 2019-11-04 PROCEDURE — 36415 COLL VENOUS BLD VENIPUNCTURE: CPT | Mod: PO

## 2019-11-04 PROCEDURE — 85025 COMPLETE CBC W/AUTO DIFF WBC: CPT

## 2019-11-04 PROCEDURE — 80061 LIPID PANEL: CPT

## 2019-11-04 PROCEDURE — 80053 COMPREHEN METABOLIC PANEL: CPT

## 2019-11-04 PROCEDURE — 82306 VITAMIN D 25 HYDROXY: CPT

## 2019-11-04 PROCEDURE — 86677 HELICOBACTER PYLORI ANTIBODY: CPT

## 2019-11-06 DIAGNOSIS — A04.8 H. PYLORI INFECTION: ICD-10-CM

## 2019-11-06 DIAGNOSIS — A04.8 H. PYLORI INFECTION: Primary | ICD-10-CM

## 2019-11-06 LAB — H PYLORI IGG SERPL QL IA: POSITIVE

## 2019-11-06 RX ORDER — OMEPRAZOLE 40 MG/1
40 CAPSULE, DELAYED RELEASE ORAL DAILY
Qty: 14 CAPSULE | Refills: 0 | Status: SHIPPED | OUTPATIENT
Start: 2019-11-06 | End: 2019-11-06 | Stop reason: SDUPTHER

## 2019-11-06 RX ORDER — OMEPRAZOLE 40 MG/1
CAPSULE, DELAYED RELEASE ORAL
Qty: 30 CAPSULE | Refills: 0 | Status: SHIPPED | OUTPATIENT
Start: 2019-11-06 | End: 2019-11-17 | Stop reason: SDUPTHER

## 2019-11-06 RX ORDER — CLARITHROMYCIN 500 MG/1
500 TABLET, FILM COATED ORAL EVERY 12 HOURS
Qty: 28 TABLET | Refills: 0 | Status: SHIPPED | OUTPATIENT
Start: 2019-11-06 | End: 2019-11-20

## 2019-11-06 RX ORDER — AMOXICILLIN 500 MG/1
1000 TABLET, FILM COATED ORAL EVERY 12 HOURS
Qty: 56 TABLET | Refills: 0 | Status: SHIPPED | OUTPATIENT
Start: 2019-11-06 | End: 2019-11-20

## 2019-11-17 ENCOUNTER — TELEPHONE (OUTPATIENT)
Dept: INTERNAL MEDICINE | Facility: CLINIC | Age: 84
End: 2019-11-17

## 2019-11-17 DIAGNOSIS — A04.8 H. PYLORI INFECTION: ICD-10-CM

## 2019-11-18 RX ORDER — OMEPRAZOLE 40 MG/1
CAPSULE, DELAYED RELEASE ORAL
Qty: 90 CAPSULE | Refills: 0 | Status: SHIPPED | OUTPATIENT
Start: 2019-11-18 | End: 2020-01-16 | Stop reason: ALTCHOICE

## 2019-11-18 NOTE — TELEPHONE ENCOUNTER
----- Message from Coral Mora sent at 11/18/2019  1:39 PM CST -----  Contact: patient  Calling with questions about her test results. Please call patient @ [phone]. Thanks, pablo

## 2019-12-13 DIAGNOSIS — I50.9 CHRONIC CONGESTIVE HEART FAILURE: ICD-10-CM

## 2019-12-16 RX ORDER — POTASSIUM CHLORIDE 20 MEQ/1
TABLET, EXTENDED RELEASE ORAL
Qty: 90 TABLET | Refills: 3 | Status: SHIPPED | OUTPATIENT
Start: 2019-12-16 | End: 2021-01-12 | Stop reason: SDUPTHER

## 2020-01-02 ENCOUNTER — PATIENT OUTREACH (OUTPATIENT)
Dept: ADMINISTRATIVE | Facility: HOSPITAL | Age: 85
End: 2020-01-02

## 2020-01-16 ENCOUNTER — OFFICE VISIT (OUTPATIENT)
Dept: INTERNAL MEDICINE | Facility: CLINIC | Age: 85
End: 2020-01-16
Payer: MEDICARE

## 2020-01-16 VITALS
TEMPERATURE: 98 F | HEART RATE: 60 BPM | BODY MASS INDEX: 28.24 KG/M2 | WEIGHT: 165.38 LBS | SYSTOLIC BLOOD PRESSURE: 138 MMHG | DIASTOLIC BLOOD PRESSURE: 70 MMHG | HEIGHT: 64 IN | OXYGEN SATURATION: 98 %

## 2020-01-16 DIAGNOSIS — N18.30 CKD (CHRONIC KIDNEY DISEASE) STAGE 3, GFR 30-59 ML/MIN: Primary | ICD-10-CM

## 2020-01-16 DIAGNOSIS — I11.0 HYPERTENSIVE HEART DISEASE WITH CONGESTIVE HEART FAILURE, UNSPECIFIED HEART FAILURE TYPE: ICD-10-CM

## 2020-01-16 DIAGNOSIS — K21.9 GASTROESOPHAGEAL REFLUX DISEASE, ESOPHAGITIS PRESENCE NOT SPECIFIED: ICD-10-CM

## 2020-01-16 DIAGNOSIS — R10.13 EPIGASTRIC PAIN: ICD-10-CM

## 2020-01-16 PROCEDURE — 99213 OFFICE O/P EST LOW 20 MIN: CPT | Mod: PBBFAC,PN | Performed by: INTERNAL MEDICINE

## 2020-01-16 PROCEDURE — 1125F PR PAIN SEVERITY QUANTIFIED, PAIN PRESENT: ICD-10-PCS | Mod: ,,, | Performed by: INTERNAL MEDICINE

## 2020-01-16 PROCEDURE — 1159F PR MEDICATION LIST DOCUMENTED IN MEDICAL RECORD: ICD-10-PCS | Mod: ,,, | Performed by: INTERNAL MEDICINE

## 2020-01-16 PROCEDURE — 99999 PR PBB SHADOW E&M-EST. PATIENT-LVL III: ICD-10-PCS | Mod: PBBFAC,,, | Performed by: INTERNAL MEDICINE

## 2020-01-16 PROCEDURE — 99214 OFFICE O/P EST MOD 30 MIN: CPT | Mod: S$PBB,,, | Performed by: INTERNAL MEDICINE

## 2020-01-16 PROCEDURE — 1125F AMNT PAIN NOTED PAIN PRSNT: CPT | Mod: ,,, | Performed by: INTERNAL MEDICINE

## 2020-01-16 PROCEDURE — 99214 PR OFFICE/OUTPT VISIT, EST, LEVL IV, 30-39 MIN: ICD-10-PCS | Mod: S$PBB,,, | Performed by: INTERNAL MEDICINE

## 2020-01-16 PROCEDURE — 1159F MED LIST DOCD IN RCRD: CPT | Mod: ,,, | Performed by: INTERNAL MEDICINE

## 2020-01-16 PROCEDURE — 99999 PR PBB SHADOW E&M-EST. PATIENT-LVL III: CPT | Mod: PBBFAC,,, | Performed by: INTERNAL MEDICINE

## 2020-01-16 RX ORDER — NITROFURANTOIN (MACROCRYSTALS) 100 MG/1
CAPSULE ORAL
Refills: 0 | COMMUNITY
Start: 2019-10-16

## 2020-01-16 RX ORDER — HYDRALAZINE HYDROCHLORIDE AND ISOSORBIDE DINITRATE 37.5; 2 MG/1; MG/1
TABLET, FILM COATED ORAL
Refills: 3 | COMMUNITY
Start: 2019-12-02 | End: 2020-01-16

## 2020-01-16 RX ORDER — FAMOTIDINE 40 MG/1
40 TABLET, FILM COATED ORAL 2 TIMES DAILY PRN
Qty: 30 TABLET | Refills: 11 | Status: SHIPPED | OUTPATIENT
Start: 2020-01-16 | End: 2020-04-27

## 2020-01-16 RX ORDER — CHOLECALCIFEROL (VITAMIN D3) 25 MCG
5000 TABLET ORAL DAILY
COMMUNITY
End: 2021-03-25 | Stop reason: SDUPTHER

## 2020-01-16 NOTE — PROGRESS NOTES
"Subjective:      Patient ID: Amy Ding is a 86 y.o. female.    Chief Complaint: Follow-up (GERD, CDK3, Epigastric)      HPI     Ms. Amy Ding is a patient of Chicho Black MD, who presents for Follow-up (GERD, CDK3, Epigastric)  FU on CKD3, epigastric discomfort.    She reports her epigastric discomfort is "a lot better" since her 2 week triple tx for H pylori, which she completed in November. She only has some omeprazole capsules left, which she had been taking daily.     VS, labs & imaging reviewed and discussed with patient, including H pylori +, vitamin D 35, eGFR 43.2 (stable since 1/2017), o/w FLP WNL on 11/4/19.      Past Medical History:   Diagnosis Date    Atrial fibrillation     PAROXYSMAL    CKD (chronic kidney disease) stage 3, GFR 30-59 ml/min     Diastolic heart failure     Grade 1 diastolic dysfunction; EF 60-65% per ECHO 4/28/17    H. pylori infection 11/06/2019    triple tx prescribed 11/6/19    High cholesterol     History of kidney cancer     s/p R partial (lower pole) nephrectomy (2/2 malignant neoplasm); L kidney intact; Now on Trospium CL 20 mg BID; followed by Kody Cardona MD Urology    HTN (hypertension)     Left renal mass     Malignant, except renal pelvis, small, stable, on surveillance protocol per Dr. Clark's note dated 2/7/19 based on CT abn dated 1/8/19; s/p R partial nephrectomy due to primary malignant neoplasm of bilateral kidneys    Stroke     TIA - 2015    TIA (transient ischemic attack) 2014    [facility], where she was started on Plavix    UTI (urinary tract infection) 10/07/2019    per [facility]; u/a & Ucx ordered by Dr. Kody Cardona MD; 2+LE, 40-60 WBC, few bact; Ucx: >100K E coli: S to nitrofurantoin, cipro; R to Bactrim, Zosyn, gent, cefazolin, Unasyn, amp,, I to Augmentin     Past Surgical History:   Procedure Laterality Date    HERNIA REPAIR      right partial nephrectomy Right 2015     Social History     Socioeconomic History    Marital status:      " Spouse name: Not on file    Number of children: Not on file    Years of education: Not on file    Highest education level: Not on file   Occupational History    Occupation: disabled   Social Needs    Financial resource strain: Not on file    Food insecurity:     Worry: Not on file     Inability: Not on file    Transportation needs:     Medical: Not on file     Non-medical: Not on file   Tobacco Use    Smoking status: Never Smoker    Smokeless tobacco: Never Used   Substance and Sexual Activity    Alcohol use: No    Drug use: No    Sexual activity: Not on file   Lifestyle    Physical activity:     Days per week: Not on file     Minutes per session: Not on file    Stress: Not on file   Relationships    Social connections:     Talks on phone: Not on file     Gets together: Not on file     Attends Shinto service: Not on file     Active member of club or organization: Not on file     Attends meetings of clubs or organizations: Not on file     Relationship status: Not on file   Other Topics Concern    Not on file   Social History Narrative    Breakfast: Cereal or oatmeal or grits; Coffee with milk and Splenda    Lunch: Baked chicken, veggie, sometimes rice; water or fruit juice    Dinner: Shrimp soup    Snacks: Rare    Eats out: 3x/wk    Water: 3 (16 oz) bottles/day (previously 32 oz/d)      Family History   Problem Relation Age of Onset    Cancer Mother     Thyroid disease Mother     Cancer Sister     Cancer Sister        Current Outpatient Medications:     amiodarone (PACERONE) 200 MG Tab, Take 100 mg by mouth once daily., Disp: , Rfl:     atorvastatin (LIPITOR) 20 MG tablet, Take 1 tablet (20 mg total) by mouth once daily., Disp: 90 tablet, Rfl: 3    bumetanide (BUMEX) 1 MG tablet, TK 1 T PO BID, Disp: , Rfl: 0    clopidogrel (PLAVIX) 75 mg tablet, TK 1 T PO QD, Disp: , Rfl: 1    Lactobacillus rhamnosus GG (CULTURELLE) 10 billion cell capsule, Take 1 capsule by mouth once daily., Disp: ,  "Rfl:     potassium chloride SA (K-DUR,KLOR-CON) 20 MEQ tablet, TAKE 1 TABLET BY MOUTH EVERY DAY, Disp: 90 tablet, Rfl: 3    trospium (SANCTURA) 20 mg Tab tablet, Take 20 mg by mouth 2 (two) times daily., Disp: , Rfl: 3    vitamin D (VITAMIN D3) 1000 units Tab, Take 5,000 Units by mouth once daily., Disp: , Rfl:     BESIVANCE 0.6 % DrpS, INSTILL 1 DROP INTO THE LEFT EYE TID, Disp: , Rfl: 1    dicyclomine (BENTYL) 20 mg tablet, TK 1 T PO QID PRN CRAMPING, Disp: , Rfl: 0    docusate sodium (COLACE) 100 MG capsule, Take 1 capsule (100 mg total) by mouth 3 (three) times daily as needed for Constipation. (Patient not taking: Reported on 10/15/2019), Disp: 30 capsule, Rfl: 3    famotidine (PEPCID) 40 MG tablet, Take 1 tablet (40 mg total) by mouth 2 (two) times daily as needed for Heartburn., Disp: 30 tablet, Rfl: 11    hydrALAZINE (APRESOLINE) 10 MG tablet, Take 1 tablet (10 mg total) by mouth every 12 (twelve) hours. (Patient not taking: Reported on 10/15/2019), Disp: 60 tablet, Rfl: 11    isosorbide dinitrate (ISORDIL) 10 MG tablet, Take 1 tablet (10 mg total) by mouth 2 (two) times daily. (Patient not taking: Reported on 1/16/2020), Disp: 60 tablet, Rfl: 11    loratadine (CLARITIN) 10 mg tablet, Take 1 tablet (10 mg total) by mouth once daily., Disp: , Rfl: 0    nitrofurantoin (MACRODANTIN) 100 MG capsule, TK 1 C PO BID FOR 10 DAYS, Disp: , Rfl: 0    prednisoLONE acetate (PRED FORTE) 1 % DrpS, , Disp: , Rfl: 0    Review of patient's allergies indicates:   Allergen Reactions    Sulfa (sulfonamide antibiotics) Anaphylaxis        Review of Systems   All remaining systems negative    Objective:     /70 (BP Location: Left arm, Patient Position: Sitting, BP Method: Medium (Manual))   Pulse 60   Temp 97.9 °F (36.6 °C) (Temporal)   Ht 5' 4" (1.626 m)   Wt 75 kg (165 lb 5.5 oz)   SpO2 98%   BMI 28.38 kg/m²     Physical Exam  GEN: A&O fully, NAD  PSYC: Normal affect      Lab Results   Component Value " Date    WBC 4.70 11/04/2019    HGB 13.2 11/04/2019    HCT 42.7 11/04/2019     11/04/2019    CHOL 188 11/04/2019    TRIG 68 11/04/2019    HDL 66 11/04/2019    LDLCALC 108.4 11/04/2019    ALT 23 11/04/2019    AST 26 11/04/2019     11/04/2019    K 4.3 11/04/2019     11/04/2019    CREATININE 1.3 11/04/2019    BUN 27 (H) 11/04/2019    CO2 25 11/04/2019    CALCIUM 9.0 11/04/2019       Assessment:      1. CKD (chronic kidney disease) stage 3, GFR 30-59 ml/min: Stable.    2. Hypertensive heart disease with congestive heart failure, unspecified heart failure type: Stable. Continue current medical regimen.   3. Epigastric pain: Improving. Will check for residual H pylori infx in stool via Ag.    4. Gastroesophageal reflux disease, esophagitis presence not specified: Risks and benefits discussed and patient chose to move forward with exchanging her omeprazole with famotidine 40 mg BID prn.       Plan:   CKD (chronic kidney disease) stage 3, GFR 30-59 ml/min    Hypertensive heart disease with congestive heart failure, unspecified heart failure type    Epigastric pain  -     H. pylori antigen, stool; Future; Expected date: 01/16/2020    Gastroesophageal reflux disease, esophagitis presence not specified  -     famotidine (PEPCID) 40 MG tablet; Take 1 tablet (40 mg total) by mouth 2 (two) times daily as needed for Heartburn.  Dispense: 30 tablet; Refill: 11      Follow up in about 2 months (around 3/16/2020), or if symptoms worsen or fail to improve, for FU on epigastric pain & h/o H pylori.    I spent >45 minutes of time with patient 50% or more of which was discussing labs and plans of care.

## 2020-01-17 ENCOUNTER — LAB VISIT (OUTPATIENT)
Dept: LAB | Facility: HOSPITAL | Age: 85
End: 2020-01-17
Attending: INTERNAL MEDICINE
Payer: MEDICARE

## 2020-01-17 DIAGNOSIS — R10.13 EPIGASTRIC PAIN: ICD-10-CM

## 2020-01-17 PROCEDURE — 87338 HPYLORI STOOL AG IA: CPT

## 2020-01-23 LAB — H PYLORI AG STL QL IA: NOT DETECTED

## 2020-02-04 ENCOUNTER — TELEPHONE (OUTPATIENT)
Dept: INTERNAL MEDICINE | Facility: CLINIC | Age: 85
End: 2020-02-04

## 2020-02-04 NOTE — TELEPHONE ENCOUNTER
"----- Message from Chicho Blakc MD sent at 2/3/2020  7:11 PM CST -----  Dear Estelle,     Please let our patient know we received lab results from Dr. Kody Cardona office, which indicated she has a UTI that is caused by a strain of E. coli that is resistant to nitrofurantoin ("Macrobid"), which is on her medication list and I assume Dr. Cardona changed to Cipro. Is that the case? If not, which ABx is she taking and is she still having UTI sx?    Thanks!    "

## 2020-03-17 ENCOUNTER — OFFICE VISIT (OUTPATIENT)
Dept: INTERNAL MEDICINE | Facility: CLINIC | Age: 85
End: 2020-03-17
Payer: MEDICARE

## 2020-03-17 ENCOUNTER — LAB VISIT (OUTPATIENT)
Dept: LAB | Facility: HOSPITAL | Age: 85
End: 2020-03-17
Attending: INTERNAL MEDICINE
Payer: MEDICARE

## 2020-03-17 VITALS
WEIGHT: 167.13 LBS | DIASTOLIC BLOOD PRESSURE: 66 MMHG | BODY MASS INDEX: 28.53 KG/M2 | HEART RATE: 67 BPM | TEMPERATURE: 98 F | OXYGEN SATURATION: 96 % | SYSTOLIC BLOOD PRESSURE: 128 MMHG | HEIGHT: 64 IN

## 2020-03-17 DIAGNOSIS — R10.30 LOWER ABDOMINAL PAIN: Primary | ICD-10-CM

## 2020-03-17 DIAGNOSIS — R10.30 LOWER ABDOMINAL PAIN: ICD-10-CM

## 2020-03-17 DIAGNOSIS — R53.82 CHRONIC FATIGUE: ICD-10-CM

## 2020-03-17 LAB
ALBUMIN SERPL BCP-MCNC: 3.5 G/DL (ref 3.5–5.2)
ALP SERPL-CCNC: 114 U/L (ref 55–135)
ALT SERPL W/O P-5'-P-CCNC: 29 U/L (ref 10–44)
ANION GAP SERPL CALC-SCNC: 11 MMOL/L (ref 8–16)
AST SERPL-CCNC: 28 U/L (ref 10–40)
BASOPHILS # BLD AUTO: 0.01 K/UL (ref 0–0.2)
BASOPHILS NFR BLD: 0.2 % (ref 0–1.9)
BILIRUB SERPL-MCNC: 0.4 MG/DL (ref 0.1–1)
BILIRUB SERPL-MCNC: NORMAL MG/DL
BLOOD, POC UA: NEGATIVE
BUN SERPL-MCNC: 25 MG/DL (ref 8–23)
CALCIUM SERPL-MCNC: 8.9 MG/DL (ref 8.7–10.5)
CHLORIDE SERPL-SCNC: 105 MMOL/L (ref 95–110)
CO2 SERPL-SCNC: 24 MMOL/L (ref 23–29)
CREAT SERPL-MCNC: 1.5 MG/DL (ref 0.5–1.4)
DIFFERENTIAL METHOD: ABNORMAL
EOSINOPHIL # BLD AUTO: 0 K/UL (ref 0–0.5)
EOSINOPHIL NFR BLD: 0.4 % (ref 0–8)
ERYTHROCYTE [DISTWIDTH] IN BLOOD BY AUTOMATED COUNT: 13.3 % (ref 11.5–14.5)
EST. GFR  (AFRICAN AMERICAN): 36.1 ML/MIN/1.73 M^2
EST. GFR  (NON AFRICAN AMERICAN): 31.3 ML/MIN/1.73 M^2
GLUCOSE SERPL-MCNC: 110 MG/DL (ref 70–110)
GLUCOSE UR QL STRIP: NORMAL
HCT VFR BLD AUTO: 42.9 % (ref 37–48.5)
HGB BLD-MCNC: 13.3 G/DL (ref 12–16)
IMM GRANULOCYTES # BLD AUTO: 0.03 K/UL (ref 0–0.04)
IMM GRANULOCYTES NFR BLD AUTO: 0.6 % (ref 0–0.5)
KETONES UR QL STRIP: NEGATIVE
LEUKOCYTE ESTERASE URINE, POC: NORMAL
LYMPHOCYTES # BLD AUTO: 1.3 K/UL (ref 1–4.8)
LYMPHOCYTES NFR BLD: 25.1 % (ref 18–48)
MCH RBC QN AUTO: 31.4 PG (ref 27–31)
MCHC RBC AUTO-ENTMCNC: 31 G/DL (ref 32–36)
MCV RBC AUTO: 101 FL (ref 82–98)
MONOCYTES # BLD AUTO: 0.6 K/UL (ref 0.3–1)
MONOCYTES NFR BLD: 11.1 % (ref 4–15)
NEUTROPHILS # BLD AUTO: 3.3 K/UL (ref 1.8–7.7)
NEUTROPHILS NFR BLD: 62.6 % (ref 38–73)
NITRITE, POC UA: NEGATIVE
NRBC BLD-RTO: 0 /100 WBC
PH, POC UA: 7
PLATELET # BLD AUTO: 196 K/UL (ref 150–350)
PMV BLD AUTO: 11.1 FL (ref 9.2–12.9)
POTASSIUM SERPL-SCNC: 4.4 MMOL/L (ref 3.5–5.1)
PROT SERPL-MCNC: 7.7 G/DL (ref 6–8.4)
PROTEIN, POC: NORMAL
RBC # BLD AUTO: 4.24 M/UL (ref 4–5.4)
SODIUM SERPL-SCNC: 140 MMOL/L (ref 136–145)
SPECIFIC GRAVITY, POC UA: 1.01
TSH SERPL DL<=0.005 MIU/L-ACNC: 0.46 UIU/ML (ref 0.4–4)
UROBILINOGEN, POC UA: NORMAL
WBC # BLD AUTO: 5.22 K/UL (ref 3.9–12.7)

## 2020-03-17 PROCEDURE — 99213 OFFICE O/P EST LOW 20 MIN: CPT | Mod: PBBFAC,PN | Performed by: INTERNAL MEDICINE

## 2020-03-17 PROCEDURE — 99999 PR PBB SHADOW E&M-EST. PATIENT-LVL III: CPT | Mod: PBBFAC,,, | Performed by: INTERNAL MEDICINE

## 2020-03-17 PROCEDURE — 87086 URINE CULTURE/COLONY COUNT: CPT

## 2020-03-17 PROCEDURE — 84443 ASSAY THYROID STIM HORMONE: CPT

## 2020-03-17 PROCEDURE — 99214 PR OFFICE/OUTPT VISIT, EST, LEVL IV, 30-39 MIN: ICD-10-PCS | Mod: S$PBB,,, | Performed by: INTERNAL MEDICINE

## 2020-03-17 PROCEDURE — 99999 PR PBB SHADOW E&M-EST. PATIENT-LVL III: ICD-10-PCS | Mod: PBBFAC,,, | Performed by: INTERNAL MEDICINE

## 2020-03-17 PROCEDURE — 85025 COMPLETE CBC W/AUTO DIFF WBC: CPT

## 2020-03-17 PROCEDURE — 81003 URINALYSIS AUTO W/O SCOPE: CPT | Mod: PBBFAC,PN | Performed by: INTERNAL MEDICINE

## 2020-03-17 PROCEDURE — 36415 COLL VENOUS BLD VENIPUNCTURE: CPT | Mod: PO

## 2020-03-17 PROCEDURE — 80053 COMPREHEN METABOLIC PANEL: CPT

## 2020-03-17 PROCEDURE — 99214 OFFICE O/P EST MOD 30 MIN: CPT | Mod: S$PBB,,, | Performed by: INTERNAL MEDICINE

## 2020-03-17 RX ORDER — HYDRALAZINE HYDROCHLORIDE AND ISOSORBIDE DINITRATE 37.5; 2 MG/1; MG/1
TABLET, FILM COATED ORAL
COMMUNITY
Start: 2020-02-20

## 2020-03-17 RX ORDER — CEFDINIR 300 MG/1
CAPSULE ORAL
COMMUNITY
Start: 2020-02-11 | End: 2020-03-17

## 2020-03-17 NOTE — PROGRESS NOTES
"Subjective:      Patient ID: Amy Ding is a 86 y.o. female.    Chief Complaint: Follow-up      HPI     MsSuresh Ding is a patient of Chicho Black MD, who presents for Follow-up    She reports 4-5/10 intensity lower abdominal discomfort, which occurs several times per week, each can last hours. Last BM yesterday. No n/v. No melena. No f/c. No dysuria. +urinary frequency, which she wonders if it is due to her bumetanide.    She reports recently being dx'd with UTI last week by her nephrologist, Dr. Kody Cardona. She knows the antibiotic she is taking starts with the letter "N".      VS, labs & imaging reviewed and discussed with patient, including H pylori +, vitamin D 35, eGFR 43.2 (stable since 1/2017), o/w FLP WNL on 11/4/19.      Past Medical History:   Diagnosis Date    Atrial fibrillation     PAROXYSMAL    CKD (chronic kidney disease) stage 3, GFR 30-59 ml/min     Diastolic heart failure     Grade 1 diastolic dysfunction; EF 60-65% per ECHO 4/28/17    H. pylori infection 11/06/2019    triple tx prescribed 11/6/19    High cholesterol     History of kidney cancer     s/p R partial (lower pole) nephrectomy (2/2 malignant neoplasm); L kidney intact; Now on Trospium CL 20 mg BID; followed by Kody Cardona MD Urology    HTN (hypertension)     Left renal mass 10/29/2015    Malignant, except renal pelvis, small, stable, on surveillance protocol per Dr. Clark's note dated 2/7/19 based on CT abn dated 1/8/19; s/p R partial nephrectomy due to primary malignant neoplasm of bilateral kidneys    Stroke     TIA - 2015    TIA (transient ischemic attack) 2014    [facility], where she was started on Plavix    UTI (urinary tract infection) 10/07/2019    per [facility]; u/a & Ucx ordered by Dr. Kody Cardona MD; 2+LE, Ucx 1/24/20: >100K E coli: S to cipro, levo & ceftriaxone; R to Bactrim, Zosyn, Macrobid, cefazolin, amp, Augmentin     Past Surgical History:   Procedure Laterality Date    HERNIA REPAIR      right partial " nephrectomy Right 2015     Social History     Socioeconomic History    Marital status:      Spouse name: Not on file    Number of children: Not on file    Years of education: Not on file    Highest education level: Not on file   Occupational History    Occupation: disabled   Social Needs    Financial resource strain: Not on file    Food insecurity:     Worry: Not on file     Inability: Not on file    Transportation needs:     Medical: Not on file     Non-medical: Not on file   Tobacco Use    Smoking status: Never Smoker    Smokeless tobacco: Never Used   Substance and Sexual Activity    Alcohol use: No    Drug use: No    Sexual activity: Not on file   Lifestyle    Physical activity:     Days per week: Not on file     Minutes per session: Not on file    Stress: Not on file   Relationships    Social connections:     Talks on phone: Not on file     Gets together: Not on file     Attends Anabaptism service: Not on file     Active member of club or organization: Not on file     Attends meetings of clubs or organizations: Not on file     Relationship status: Not on file   Other Topics Concern    Not on file   Social History Narrative    Breakfast: Cereal or oatmeal or grits; Coffee with milk and Splenda    Lunch: Baked chicken, veggie, sometimes rice; water or fruit juice    Dinner: Shrimp soup    Snacks: Rare    Eats out: 3x/wk    Water: 3 (16 oz) bottles/day (previously 32 oz/d)      Family History   Problem Relation Age of Onset    Cancer Mother     Thyroid disease Mother     Cancer Sister     Cancer Sister        Current Outpatient Medications:     BIDIL 20-37.5 mg Tab, , Disp: , Rfl:     bumetanide (BUMEX) 1 MG tablet, TK 1 T PO BID, Disp: , Rfl: 0    clopidogrel (PLAVIX) 75 mg tablet, TK 1 T PO QD, Disp: , Rfl: 1    dicyclomine (BENTYL) 20 mg tablet, TK 1 T PO QID PRN CRAMPING, Disp: , Rfl: 0    famotidine (PEPCID) 40 MG tablet, Take 1 tablet (40 mg total) by mouth 2 (two) times daily  "as needed for Heartburn., Disp: 30 tablet, Rfl: 11    Lactobacillus rhamnosus GG (CULTURELLE) 10 billion cell capsule, Take 1 capsule by mouth once daily., Disp: , Rfl:     nitrofurantoin (MACRODANTIN) 100 MG capsule, TK 1 C PO BID FOR 10 DAYS, Disp: , Rfl: 0    potassium chloride SA (K-DUR,KLOR-CON) 20 MEQ tablet, TAKE 1 TABLET BY MOUTH EVERY DAY, Disp: 90 tablet, Rfl: 3    vitamin D (VITAMIN D3) 1000 units Tab, Take 5,000 Units by mouth once daily., Disp: , Rfl:     amiodarone (PACERONE) 200 MG Tab, Take 100 mg by mouth once daily., Disp: , Rfl:     atorvastatin (LIPITOR) 20 MG tablet, Take 1 tablet (20 mg total) by mouth once daily., Disp: 90 tablet, Rfl: 3    BESIVANCE 0.6 % DrpS, INSTILL 1 DROP INTO THE LEFT EYE TID, Disp: , Rfl: 1    loratadine (CLARITIN) 10 mg tablet, Take 1 tablet (10 mg total) by mouth once daily., Disp: , Rfl: 0    prednisoLONE acetate (PRED FORTE) 1 % DrpS, , Disp: , Rfl: 0    trospium (SANCTURA) 20 mg Tab tablet, Take 20 mg by mouth 2 (two) times daily., Disp: , Rfl: 3    Review of patient's allergies indicates:   Allergen Reactions    Sulfa (sulfonamide antibiotics) Anaphylaxis        Review of Systems   All remaining systems negative    Objective:     /66 (BP Location: Left arm, Patient Position: Sitting, BP Method: Large (Manual))   Pulse 67   Temp 97.8 °F (36.6 °C) (Temporal)   Ht 5' 4" (1.626 m)   Wt 75.8 kg (167 lb 1.7 oz)   SpO2 96%   BMI 28.68 kg/m²     Physical Exam  GEN: A&O fully, NAD  PSYC: Normal affect  GI: +TTP in suprapubic region, no epigastric TTP; S/ND, normal bowel sounds  : No TTP or percussion at costophrenic angle bilaterally      Lab Results   Component Value Date    WBC 4.70 11/04/2019    HGB 13.2 11/04/2019    HCT 42.7 11/04/2019     11/04/2019    CHOL 188 11/04/2019    TRIG 68 11/04/2019    HDL 66 11/04/2019    LDLCALC 108.4 11/04/2019    ALT 23 11/04/2019    AST 26 11/04/2019     11/04/2019    K 4.3 11/04/2019     " 11/04/2019    CREATININE 1.3 11/04/2019    BUN 27 (H) 11/04/2019    CO2 25 11/04/2019    CALCIUM 9.0 11/04/2019       Assessment:      1. Lower abdominal pain: DDx includes UTI, constipation, cholestasis, but less likely nephrolithiasis. Will check for metabolic causes and KUB. If worsens or persists, will consider abn CT w/o.   2.      Chronic fatigue: Will check TSH increased fatigue and sleeping.       Plan:   Lower abdominal pain  -     POCT Urinalysis  -     Urine culture; Future; Expected date: 03/17/2020  -     X-Ray Abdomen AP 1 View; Future; Expected date: 03/17/2020  -     H. pylori antigen, stool; Future; Expected date: 03/17/2020  -     CBC auto differential; Future; Expected date: 03/17/2020  -     Comprehensive metabolic panel; Future; Expected date: 03/17/2020    Chronic fatigue  -     TSH; Future; Expected date: 03/17/2020      Follow up in about 1 week (around 3/24/2020), or if symptoms worsen or fail to improve, for FU on abdominal pain.    I spent >25 minutes of time with patient 50% or more of which was discussing labs and plans of care.

## 2020-03-18 LAB — BACTERIA UR CULT: NORMAL

## 2020-04-26 DIAGNOSIS — A04.8 H. PYLORI INFECTION: ICD-10-CM

## 2020-04-27 RX ORDER — OMEPRAZOLE 40 MG/1
CAPSULE, DELAYED RELEASE ORAL
Qty: 90 CAPSULE | Refills: 0 | Status: SHIPPED | OUTPATIENT
Start: 2020-04-27 | End: 2020-07-23

## 2020-10-26 ENCOUNTER — PES CALL (OUTPATIENT)
Dept: ADMINISTRATIVE | Facility: CLINIC | Age: 85
End: 2020-10-26

## 2020-12-14 ENCOUNTER — PES CALL (OUTPATIENT)
Dept: ADMINISTRATIVE | Facility: CLINIC | Age: 85
End: 2020-12-14

## 2021-01-12 DIAGNOSIS — A04.8 H. PYLORI INFECTION: ICD-10-CM

## 2021-01-12 DIAGNOSIS — I50.9 CHRONIC CONGESTIVE HEART FAILURE: ICD-10-CM

## 2021-01-14 RX ORDER — POTASSIUM CHLORIDE 20 MEQ/1
20 TABLET, EXTENDED RELEASE ORAL DAILY
Qty: 90 TABLET | Refills: 1 | Status: SHIPPED | OUTPATIENT
Start: 2021-01-14 | End: 2021-07-14 | Stop reason: SDUPTHER

## 2021-01-21 DIAGNOSIS — A04.8 H. PYLORI INFECTION: ICD-10-CM

## 2021-01-21 RX ORDER — OMEPRAZOLE 40 MG/1
40 CAPSULE, DELAYED RELEASE ORAL DAILY
Qty: 90 CAPSULE | Refills: 0 | Status: SHIPPED | OUTPATIENT
Start: 2021-01-21

## 2021-01-29 ENCOUNTER — TELEPHONE (OUTPATIENT)
Dept: INTERNAL MEDICINE | Facility: CLINIC | Age: 86
End: 2021-01-29

## 2021-02-04 ENCOUNTER — TELEPHONE (OUTPATIENT)
Dept: INTERNAL MEDICINE | Facility: CLINIC | Age: 86
End: 2021-02-04

## 2021-03-25 ENCOUNTER — OFFICE VISIT (OUTPATIENT)
Dept: INTERNAL MEDICINE | Facility: CLINIC | Age: 86
End: 2021-03-25
Payer: MEDICARE

## 2021-03-25 ENCOUNTER — LAB VISIT (OUTPATIENT)
Dept: LAB | Facility: HOSPITAL | Age: 86
End: 2021-03-25
Attending: FAMILY MEDICINE
Payer: MEDICARE

## 2021-03-25 VITALS
OXYGEN SATURATION: 100 % | RESPIRATION RATE: 18 BRPM | DIASTOLIC BLOOD PRESSURE: 82 MMHG | HEART RATE: 67 BPM | TEMPERATURE: 98 F | HEIGHT: 64 IN | BODY MASS INDEX: 28.09 KG/M2 | SYSTOLIC BLOOD PRESSURE: 138 MMHG | WEIGHT: 164.56 LBS

## 2021-03-25 DIAGNOSIS — E55.9 VITAMIN D DEFICIENCY: Primary | ICD-10-CM

## 2021-03-25 DIAGNOSIS — I50.32 CHRONIC DIASTOLIC HEART FAILURE: ICD-10-CM

## 2021-03-25 DIAGNOSIS — E55.9 VITAMIN D DEFICIENCY: ICD-10-CM

## 2021-03-25 PROCEDURE — 36415 COLL VENOUS BLD VENIPUNCTURE: CPT | Mod: PO | Performed by: FAMILY MEDICINE

## 2021-03-25 PROCEDURE — 82306 VITAMIN D 25 HYDROXY: CPT | Performed by: FAMILY MEDICINE

## 2021-03-25 PROCEDURE — 99213 PR OFFICE/OUTPT VISIT, EST, LEVL III, 20-29 MIN: ICD-10-PCS | Mod: S$PBB,,, | Performed by: FAMILY MEDICINE

## 2021-03-25 PROCEDURE — 99213 OFFICE O/P EST LOW 20 MIN: CPT | Mod: S$PBB,,, | Performed by: FAMILY MEDICINE

## 2021-03-25 PROCEDURE — 99999 PR PBB SHADOW E&M-EST. PATIENT-LVL V: ICD-10-PCS | Mod: PBBFAC,,, | Performed by: FAMILY MEDICINE

## 2021-03-25 PROCEDURE — 99999 PR PBB SHADOW E&M-EST. PATIENT-LVL V: CPT | Mod: PBBFAC,,, | Performed by: FAMILY MEDICINE

## 2021-03-25 PROCEDURE — 99215 OFFICE O/P EST HI 40 MIN: CPT | Mod: PBBFAC,PN | Performed by: FAMILY MEDICINE

## 2021-03-25 RX ORDER — CHOLECALCIFEROL (VITAMIN D3) 25 MCG
5000 TABLET ORAL WEEKLY
Qty: 12 TABLET | Refills: 3 | Status: SHIPPED | OUTPATIENT
Start: 2021-03-25

## 2021-03-26 LAB — 25(OH)D3+25(OH)D2 SERPL-MCNC: 31 NG/ML (ref 30–96)

## 2021-03-29 ENCOUNTER — TELEPHONE (OUTPATIENT)
Dept: INTERNAL MEDICINE | Facility: CLINIC | Age: 86
End: 2021-03-29

## 2021-03-30 ENCOUNTER — TELEPHONE (OUTPATIENT)
Dept: INTERNAL MEDICINE | Facility: CLINIC | Age: 86
End: 2021-03-30

## 2021-04-01 ENCOUNTER — TELEPHONE (OUTPATIENT)
Dept: INTERNAL MEDICINE | Facility: CLINIC | Age: 86
End: 2021-04-01

## 2021-04-26 ENCOUNTER — LAB VISIT (OUTPATIENT)
Dept: LAB | Facility: HOSPITAL | Age: 86
End: 2021-04-26
Attending: FAMILY MEDICINE
Payer: MEDICAID

## 2021-04-26 ENCOUNTER — OFFICE VISIT (OUTPATIENT)
Dept: INTERNAL MEDICINE | Facility: CLINIC | Age: 86
End: 2021-04-26
Payer: MEDICARE

## 2021-04-26 VITALS
HEIGHT: 64 IN | OXYGEN SATURATION: 96 % | TEMPERATURE: 98 F | SYSTOLIC BLOOD PRESSURE: 128 MMHG | RESPIRATION RATE: 16 BRPM | DIASTOLIC BLOOD PRESSURE: 82 MMHG | WEIGHT: 163.56 LBS | HEART RATE: 71 BPM | BODY MASS INDEX: 27.92 KG/M2

## 2021-04-26 DIAGNOSIS — E78.2 MIXED HYPERLIPIDEMIA: ICD-10-CM

## 2021-04-26 DIAGNOSIS — Z00.00 ENCOUNTER FOR WELLNESS EXAMINATION IN ADULT: ICD-10-CM

## 2021-04-26 DIAGNOSIS — J30.2 SEASONAL ALLERGIES: ICD-10-CM

## 2021-04-26 DIAGNOSIS — I52 OTHER HEART DISORDERS IN DISEASES CLASSIFIED ELSEWHERE: ICD-10-CM

## 2021-04-26 DIAGNOSIS — Z00.00 ENCOUNTER FOR WELLNESS EXAMINATION IN ADULT: Primary | ICD-10-CM

## 2021-04-26 DIAGNOSIS — K59.00 CONSTIPATION, UNSPECIFIED CONSTIPATION TYPE: ICD-10-CM

## 2021-04-26 LAB
BASOPHILS # BLD AUTO: 0.02 K/UL (ref 0–0.2)
BASOPHILS NFR BLD: 0.4 % (ref 0–1.9)
DIFFERENTIAL METHOD: ABNORMAL
EOSINOPHIL # BLD AUTO: 0 K/UL (ref 0–0.5)
EOSINOPHIL NFR BLD: 0.4 % (ref 0–8)
ERYTHROCYTE [DISTWIDTH] IN BLOOD BY AUTOMATED COUNT: 13.8 % (ref 11.5–14.5)
HCT VFR BLD AUTO: 41 % (ref 37–48.5)
HGB BLD-MCNC: 12.8 G/DL (ref 12–16)
IMM GRANULOCYTES # BLD AUTO: 0.03 K/UL (ref 0–0.04)
IMM GRANULOCYTES NFR BLD AUTO: 0.6 % (ref 0–0.5)
LYMPHOCYTES # BLD AUTO: 1 K/UL (ref 1–4.8)
LYMPHOCYTES NFR BLD: 19.4 % (ref 18–48)
MCH RBC QN AUTO: 30.7 PG (ref 27–31)
MCHC RBC AUTO-ENTMCNC: 31.2 G/DL (ref 32–36)
MCV RBC AUTO: 98 FL (ref 82–98)
MONOCYTES # BLD AUTO: 0.7 K/UL (ref 0.3–1)
MONOCYTES NFR BLD: 13.2 % (ref 4–15)
NEUTROPHILS # BLD AUTO: 3.5 K/UL (ref 1.8–7.7)
NEUTROPHILS NFR BLD: 66 % (ref 38–73)
NRBC BLD-RTO: 0 /100 WBC
PLATELET # BLD AUTO: 206 K/UL (ref 150–450)
PMV BLD AUTO: 10.9 FL (ref 9.2–12.9)
RBC # BLD AUTO: 4.17 M/UL (ref 4–5.4)
WBC # BLD AUTO: 5.3 K/UL (ref 3.9–12.7)

## 2021-04-26 PROCEDURE — 36415 COLL VENOUS BLD VENIPUNCTURE: CPT | Mod: PO | Performed by: FAMILY MEDICINE

## 2021-04-26 PROCEDURE — 85025 COMPLETE CBC W/AUTO DIFF WBC: CPT | Performed by: FAMILY MEDICINE

## 2021-04-26 PROCEDURE — 80061 LIPID PANEL: CPT | Performed by: FAMILY MEDICINE

## 2021-04-26 PROCEDURE — 99999 PR PBB SHADOW E&M-EST. PATIENT-LVL V: ICD-10-PCS | Mod: PBBFAC,,, | Performed by: FAMILY MEDICINE

## 2021-04-26 PROCEDURE — 99999 PR PBB SHADOW E&M-EST. PATIENT-LVL V: CPT | Mod: PBBFAC,,, | Performed by: FAMILY MEDICINE

## 2021-04-26 PROCEDURE — 99215 OFFICE O/P EST HI 40 MIN: CPT | Mod: PBBFAC,PN | Performed by: FAMILY MEDICINE

## 2021-04-26 PROCEDURE — 99214 PR OFFICE/OUTPT VISIT, EST, LEVL IV, 30-39 MIN: ICD-10-PCS | Mod: S$PBB,,, | Performed by: FAMILY MEDICINE

## 2021-04-26 PROCEDURE — 99214 OFFICE O/P EST MOD 30 MIN: CPT | Mod: S$PBB,,, | Performed by: FAMILY MEDICINE

## 2021-04-26 PROCEDURE — 80053 COMPREHEN METABOLIC PANEL: CPT | Performed by: FAMILY MEDICINE

## 2021-04-26 RX ORDER — POLYETHYLENE GLYCOL 3350 17 G/17G
17 POWDER, FOR SOLUTION ORAL DAILY PRN
Qty: 30 PACKET | Refills: 11 | Status: SHIPPED | OUTPATIENT
Start: 2021-04-26

## 2021-04-26 RX ORDER — ALUMINUM ZIRCONIUM TETRACHLOROHYDREX GLY 0.18 G/G
1 STICK TOPICAL
Qty: 500 G | Refills: 11 | Status: SHIPPED | OUTPATIENT
Start: 2021-04-26

## 2021-04-26 RX ORDER — CETIRIZINE HYDROCHLORIDE 5 MG/1
5 TABLET, CHEWABLE ORAL DAILY PRN
Qty: 30 TABLET | Refills: 0 | Status: SHIPPED | OUTPATIENT
Start: 2021-04-26 | End: 2022-04-26

## 2021-04-27 ENCOUNTER — TELEPHONE (OUTPATIENT)
Dept: INTERNAL MEDICINE | Facility: CLINIC | Age: 86
End: 2021-04-27

## 2021-04-27 LAB
ALBUMIN SERPL BCP-MCNC: 3.4 G/DL (ref 3.5–5.2)
ALP SERPL-CCNC: 107 U/L (ref 55–135)
ALT SERPL W/O P-5'-P-CCNC: 27 U/L (ref 10–44)
ANION GAP SERPL CALC-SCNC: 10 MMOL/L (ref 8–16)
AST SERPL-CCNC: 26 U/L (ref 10–40)
BILIRUB SERPL-MCNC: 0.3 MG/DL (ref 0.1–1)
BUN SERPL-MCNC: 20 MG/DL (ref 8–23)
CALCIUM SERPL-MCNC: 8.9 MG/DL (ref 8.7–10.5)
CHLORIDE SERPL-SCNC: 108 MMOL/L (ref 95–110)
CHOLEST SERPL-MCNC: 167 MG/DL (ref 120–199)
CHOLEST/HDLC SERPL: 3 {RATIO} (ref 2–5)
CO2 SERPL-SCNC: 25 MMOL/L (ref 23–29)
CREAT SERPL-MCNC: 1.1 MG/DL (ref 0.5–1.4)
EST. GFR  (AFRICAN AMERICAN): 52.2 ML/MIN/1.73 M^2
EST. GFR  (NON AFRICAN AMERICAN): 45.3 ML/MIN/1.73 M^2
GLUCOSE SERPL-MCNC: 118 MG/DL (ref 70–110)
HDLC SERPL-MCNC: 55 MG/DL (ref 40–75)
HDLC SERPL: 32.9 % (ref 20–50)
LDLC SERPL CALC-MCNC: 94.4 MG/DL (ref 63–159)
NONHDLC SERPL-MCNC: 112 MG/DL
POTASSIUM SERPL-SCNC: 4.2 MMOL/L (ref 3.5–5.1)
PROT SERPL-MCNC: 7.2 G/DL (ref 6–8.4)
SODIUM SERPL-SCNC: 143 MMOL/L (ref 136–145)
TRIGL SERPL-MCNC: 88 MG/DL (ref 30–150)

## 2021-04-29 ENCOUNTER — PES CALL (OUTPATIENT)
Dept: ADMINISTRATIVE | Facility: CLINIC | Age: 86
End: 2021-04-29

## 2021-05-05 ENCOUNTER — TELEPHONE (OUTPATIENT)
Dept: INTERNAL MEDICINE | Facility: CLINIC | Age: 86
End: 2021-05-05

## 2021-05-19 ENCOUNTER — PES CALL (OUTPATIENT)
Dept: ADMINISTRATIVE | Facility: CLINIC | Age: 86
End: 2021-05-19

## 2021-05-24 ENCOUNTER — TELEPHONE (OUTPATIENT)
Dept: INTERNAL MEDICINE | Facility: CLINIC | Age: 86
End: 2021-05-24

## 2021-06-29 ENCOUNTER — PES CALL (OUTPATIENT)
Dept: ADMINISTRATIVE | Facility: CLINIC | Age: 86
End: 2021-06-29

## 2021-07-14 DIAGNOSIS — I50.9 CHRONIC CONGESTIVE HEART FAILURE: ICD-10-CM

## 2021-07-14 RX ORDER — POTASSIUM CHLORIDE 20 MEQ/1
20 TABLET, EXTENDED RELEASE ORAL DAILY
Qty: 90 TABLET | Refills: 1 | Status: SHIPPED | OUTPATIENT
Start: 2021-07-14 | End: 2022-01-19

## 2021-07-20 ENCOUNTER — TELEPHONE (OUTPATIENT)
Dept: INTERNAL MEDICINE | Facility: CLINIC | Age: 86
End: 2021-07-20

## 2021-07-30 ENCOUNTER — PES CALL (OUTPATIENT)
Dept: ADMINISTRATIVE | Facility: CLINIC | Age: 86
End: 2021-07-30

## 2021-09-29 ENCOUNTER — TELEPHONE (OUTPATIENT)
Dept: PEDIATRICS | Facility: CLINIC | Age: 86
End: 2021-09-29

## 2021-10-05 ENCOUNTER — PES CALL (OUTPATIENT)
Dept: ADMINISTRATIVE | Facility: CLINIC | Age: 86
End: 2021-10-05

## 2022-01-15 DIAGNOSIS — I50.9 CHRONIC CONGESTIVE HEART FAILURE: ICD-10-CM

## 2022-01-19 RX ORDER — POTASSIUM CHLORIDE 20 MEQ/1
TABLET, EXTENDED RELEASE ORAL
Qty: 90 TABLET | Refills: 1 | Status: SHIPPED | OUTPATIENT
Start: 2022-01-19
